# Patient Record
Sex: FEMALE | URBAN - METROPOLITAN AREA
[De-identification: names, ages, dates, MRNs, and addresses within clinical notes are randomized per-mention and may not be internally consistent; named-entity substitution may affect disease eponyms.]

---

## 2017-08-18 ENCOUNTER — IMPORTED ENCOUNTER (OUTPATIENT)
Dept: URBAN - METROPOLITAN AREA CLINIC 1 | Facility: CLINIC | Age: 27
End: 2017-08-18

## 2017-08-18 PROBLEM — H52.13: Noted: 2017-08-18

## 2017-08-18 PROCEDURE — S0621 ROUTINE OPHTHALMOLOGICAL EXA: HCPCS

## 2017-08-18 NOTE — PATIENT DISCUSSION
1. Myopia: Rx was given for correction if indicated and requested. Return for an appointment in 1 year 36 with Dr. Mateo Castillo.

## 2018-09-19 ENCOUNTER — IMPORTED ENCOUNTER (OUTPATIENT)
Dept: URBAN - METROPOLITAN AREA CLINIC 1 | Facility: CLINIC | Age: 28
End: 2018-09-19

## 2018-09-19 PROBLEM — H52.13: Noted: 2018-09-19

## 2018-09-19 PROCEDURE — S0621 ROUTINE OPHTHALMOLOGICAL EXA: HCPCS

## 2018-09-19 NOTE — PATIENT DISCUSSION
1. Myopia: Rx was given for correction if indicated and requested. Return for an appointment in 1 year 36 with Dr. Karley Marquez.

## 2019-11-22 ENCOUNTER — IMPORTED ENCOUNTER (OUTPATIENT)
Dept: URBAN - METROPOLITAN AREA CLINIC 1 | Facility: CLINIC | Age: 29
End: 2019-11-22

## 2019-11-22 PROBLEM — H52.13: Noted: 2019-11-22

## 2019-11-22 PROCEDURE — S0621 ROUTINE OPHTHALMOLOGICAL EXA: HCPCS

## 2019-11-22 NOTE — PATIENT DISCUSSION
1. Myopia: Rx was given for correction if indicated and requested. Return for an appointment in 1 year 36 with Dr. Moni Gavin.

## 2020-12-02 NOTE — PROGRESS NOTES
[facility]  [address], [address]  Phone: [phone]  Fax: [phone]        INITIAL CONSULTATION      HISTORY OF PRESENT ILLNESS:  Mason Holland is a 31 y.o. female whom is self-referred secondary to low back pain radiating into the RLE in a S1 distribution to the foot involving the digits x 5 years, constant x 12/2019. She rates her pain 10/10. Her pain is exacerbated by standing and sitting. She has treated with Flexeril and Ibuprofen. Pt reports intolerance to NEURONTIN. Pt previously took Topamax for migraine HA's. She discontinued the medication due to resolution of migraine HA's. She recalls tolerating the medication. Pt completed PT 2-3 years ago. She received chiropractic treatment 2 years ago. She is inconsistent with her HEP. Patient denies previous spinal surgery or injections. Pt denies change in bowel or bladder habits. Pt denies fever, weight loss, or skin changes. She denies possibility of pregnancy or breastfeeding. Patient denies history of glaucoma. She was previously seen by Dr. Jeffrey Robin. Pt is a nonsmoker. PmHx of obesity, peptic ulcer disease, anxiety, migraine LAROSE's. Note from [place], [place] dated 1/8/2020 indicating patient was seen with c/o right-sided low back pain with right sided sciatica x 6 years following a fall. Treated with Flexeril and Ibuprofen. Pain was 10/10. L spine XR dated 1/8/2020 films not independently reviewed. Per report, no significant abnormality. L spine MRI dated 2/3/2020 films independently reviewed. Per report, small non-impinging posterior disc protrusion L5-S1. Mild lower lumbar degenerative facet changes, no other disc abnormality stenosis or impingement. The patient is RHD.  reviewed. Body mass index is 38.9 kg/m².     PCP: CAROLINE Gilliam    Past Medical History:   Diagnosis Date    Anxiety     GERD (gastroesophageal reflux disease)     sees a gastroenterologist    Hypertension     Migraine     sees neurologist    Pap smear     sees OB/GYN    Peptic ulcer, unspecified site, unspecified as acute or chronic, without mention of hemorrhage or perforation     S/P endoscopy     peptic ulcer    Sigmoidoscopy exam 2010    internal hemorrhoids          Past Surgical History:   Procedure Laterality Date    HX  SECTION  2019    HX TONSILLECTOMY  2yo         Social History     Tobacco Use    Smoking status: Never Smoker    Smokeless tobacco: Never Used   Substance Use Topics    Alcohol use: No       Work status: The patient is employed. Marital status: Single. Current Outpatient Medications   Medication Sig Dispense Refill    lansoprazole (PREVACID) 30 mg capsule TAKE 1 CAPSULE BY MOUTH TWICE A DAY**SPECIFIC NDC PER INSURANCE      topiramate (TOPAMAX) 100 mg tablet Take  by mouth two (2) times a day.  lorazepam (ATIVAN) 0.5 mg tablet Take  by mouth.  norethindrone-e.estradiol-iron (LO LOESTRIN FE) 1-10 (24)-75(4) mg-mcg-mg Tab Take  by mouth.  paroxetine (PAXIL) 20 mg tablet Take 1 Tab by mouth daily. (Patient not taking: Reported on 2020) 30 Tab 11    ranitidine (ZANTAC) 300 mg tablet Take 1 Tab by mouth daily. (Patient not taking: Reported on 2020) 30 Tab 11       Allergies   Allergen Reactions    Latex Rash    Pcn [Penicillins] Hives, Rash and Swelling            Family History   Problem Relation Age of Onset    Asthma Father         outgrew    High Cholesterol Mother     Hypertension Mother          REVIEW OF SYSTEMS  Constitutional symptoms: Negative  Eyes: Negative  Ears, Nose, Throat, and Mouth: Negative  Cardiovascular: Negative  Respiratory: Negative  Genitourinary: Negative  Integumentary (Skin and/or breast): Negative  Musculoskeletal: Positive for low back pain radiating into the RLE. Extremities: Negative for edema.   Endocrine/Rheumatologic: Negative  Hematologic/Lymphatic: Negative  Allergic/Immunologic: Negative  Psychiatric: Negative       PHYSICAL EXAMINATION  Visit Vitals  /83 (BP 1 Location: Left arm, BP Patient Position: Sitting)   Pulse 87   Temp 96.9 °F (36.1 °C) (Temporal)   Ht 5' (1.524 m)   Wt 199 lb 3.2 oz (90.4 kg)   SpO2 99%   BMI 38.90 kg/m²       CONSTITUTIONAL: NAD, A&O x 3  HEART: Regular rate and rhythm  GASTROINTESTINAL: Positive bowel sounds, soft, nontender, and nondistended  LUNGS: Clear to auscultation bilaterally. SKIN: Negative for rash. RANGE OF MOTION: The patient has full passive range of motion in all four extremities. SENSATION: Sensation is intact to light touch throughout. MOTOR:   Straight Leg Raise: Negative, bilateral  Villarreal: Negative, bilateral  Deep tendon reflexes are 0 at the biceps, trace at the triceps, and 0 at the brachioradialis bilaterally. Deep tendon reflexes are 2 at the knees and 1 at the ankles bilaterally. BECKY SIGN: Mildly positive, right     Increased tenderness to direct palpation of right SI joint. Shoulder AB/Flex Elbow Flex Wrist Ext Elbow Ext Wrist Flex Hand Intrin Tone   Right +4/5 +4/5 +4/5 +4/5 +4/5 +4/5 +4/5   Left +4/5 +4/5 +4/5 +4/5 +4/5 +4/5 +4/5              Hip Flex Knee Ext Knee Flex Ankle DF GTE Ankle PF Tone   Right +4/5 +4/5 +4/5 +4/5 +4/5 +4/5 +4/5   Left +4/5 +4/5 +4/5 +4/5 +4/5 +4/5 +4/5       ASSESSMENT   Diagnoses and all orders for this visit:    1. HNP (herniated nucleus pulposus), lumbar  -     EMG ONE EXTREMITY LOWER RT; Future  -     pregabalin (LYRICA) 50 mg capsule; Take 1 Cap by mouth two (2) times a day. Max Daily Amount: 100 mg.    2. Severe obesity (HCC)  -     EMG ONE EXTREMITY LOWER RT; Future  -     pregabalin (LYRICA) 50 mg capsule; Take 1 Cap by mouth two (2) times a day. Max Daily Amount: 100 mg.    3. Lumbar neuritis  -     EMG ONE EXTREMITY LOWER RT; Future  -     pregabalin (LYRICA) 50 mg capsule; Take 1 Cap by mouth two (2) times a day.  Max Daily Amount: 100 mg.    4. DDD (degenerative disc disease), lumbar  -     EMG ONE EXTREMITY LOWER RT; Future  -     pregabalin (LYRICA) 50 mg capsule; Take 1 Cap by mouth two (2) times a day. Max Daily Amount: 100 mg. IMPRESSIONS/RECOMMENDATIONS:  Patient presents today with c/o low back pain radiating into the RLE in a S1 distribution to the foot involving the digits. Multiple treatment options were discussed. I will have the patient sign a release of medical information to obtain records from Dr. Laney Bennett. I recommended she increase the frequency of HEP to daily. I will try her on Lyrica 50 mg BID. The risks, benefits, and potential side effects of this medication were discussed. Patient understands and wishes to proceed. Patient advised to call the office if intolerant to new medication. I will refer her to Neurology for a RLE EMG. Patient is neurologically intact. I will see the patient back following the EMG or earlier if needed. Written by Randall Ayala, as dictated by Tabatha Mayo MD  I examined the patient, reviewed and agree with the note.

## 2020-12-07 ENCOUNTER — OFFICE VISIT (OUTPATIENT)
Dept: ORTHOPEDIC SURGERY | Age: 30
End: 2020-12-07
Payer: COMMERCIAL

## 2020-12-07 ENCOUNTER — TELEPHONE (OUTPATIENT)
Dept: ORTHOPEDIC SURGERY | Age: 30
End: 2020-12-07

## 2020-12-07 VITALS
OXYGEN SATURATION: 99 % | SYSTOLIC BLOOD PRESSURE: 122 MMHG | TEMPERATURE: 96.9 F | HEIGHT: 60 IN | WEIGHT: 199.2 LBS | DIASTOLIC BLOOD PRESSURE: 83 MMHG | HEART RATE: 87 BPM | BODY MASS INDEX: 39.11 KG/M2

## 2020-12-07 DIAGNOSIS — M51.26 HNP (HERNIATED NUCLEUS PULPOSUS), LUMBAR: Primary | ICD-10-CM

## 2020-12-07 DIAGNOSIS — M54.16 LUMBAR NEURITIS: ICD-10-CM

## 2020-12-07 DIAGNOSIS — E66.01 SEVERE OBESITY (HCC): ICD-10-CM

## 2020-12-07 DIAGNOSIS — M51.36 DDD (DEGENERATIVE DISC DISEASE), LUMBAR: ICD-10-CM

## 2020-12-07 PROCEDURE — 99204 OFFICE O/P NEW MOD 45 MIN: CPT | Performed by: PHYSICAL MEDICINE & REHABILITATION

## 2020-12-07 RX ORDER — PREGABALIN 50 MG/1
50 CAPSULE ORAL 2 TIMES DAILY
Qty: 60 CAP | Refills: 1 | Status: SHIPPED | OUTPATIENT
Start: 2020-12-07 | End: 2021-04-27

## 2020-12-07 RX ORDER — LANSOPRAZOLE 30 MG/1
CAPSULE, DELAYED RELEASE ORAL
COMMUNITY
Start: 2020-10-11

## 2020-12-07 NOTE — LETTER
12/7/20 Patient: Mely Torres YOB: 1990 Date of Visit: 12/7/2020 Lizet Lechuga PA-C 
[address] [place] [address] [address] [place] [address] VIA Facsimile: [phone] Dear Lizet Lechuga PA-C, Thank you for referring Ms. Nichelle Brown to [address] for evaluation. My notes for this consultation are attached. If you have questions, please do not hesitate to call me. I look forward to following your patient along with you. Sincerely, Honey Siu MD

## 2020-12-07 NOTE — TELEPHONE ENCOUNTER
EMG RLE is scheduled with Dr. Sammy Lane, [address] [place] [address], [address], [phone] on 12/29/20, arrive 9:30AM, test 10:00AM

## 2021-01-07 DIAGNOSIS — M51.26 HNP (HERNIATED NUCLEUS PULPOSUS), LUMBAR: ICD-10-CM

## 2021-01-07 DIAGNOSIS — E66.01 SEVERE OBESITY (HCC): ICD-10-CM

## 2021-01-07 DIAGNOSIS — M54.16 LUMBAR NEURITIS: ICD-10-CM

## 2021-01-07 DIAGNOSIS — M51.36 DDD (DEGENERATIVE DISC DISEASE), LUMBAR: ICD-10-CM

## 2021-01-07 NOTE — PROGRESS NOTES
[facility]  [address], [address]   Phone: [phone]  Fax: [phone]        PROGRESS NOTE      HISTORY OF PRESENT ILLNESS:  The patient is a 31 y.o. female and was seen today for follow up of low back pain radiating into the RLE in an unclear distribution to the foot involving the digits x 5 years, constant x 12/2019. Her pain is exacerbated by standing and sitting. She has treated with Flexeril and Ibuprofen. Pt reports intolerance to NEURONTIN. Pt previously took Topamax for migraine HA's. She discontinued the medication due to resolution of migraine HA's. She recalls tolerating the medication. Pt completed PT 2-3 years ago. She received chiropractic treatment 2 years ago. She is inconsistent with her HEP. Patient denies previous spinal surgery or injections. Pt denies change in bowel or bladder habits. Pt denies fever, weight loss, or skin changes. She denies possibility of pregnancy or breastfeeding. Patient denies history of glaucoma. She was previously seen by Dr. Petar Iniguez. Pt is a nonsmoker. The patient is RHD. PmHx of obesity, peptic ulcer disease, anxiety, migraine LAROSE's. Note from [place], [place] dated 1/8/2020 indicating patient was seen with c/o right-sided low back pain with right sided sciatica x 6 years following a fall. Treated with Flexeril and Ibuprofen. Pain was 10/10. L spine XR dated 1/8/2020 films not independently reviewed. Per report, no significant abnormality. L spine MRI dated 2/3/2020 films independently reviewed. Per report, small non-impinging posterior disc protrusion L5-S1. Mild lower lumbar degenerative facet changes, no other disc abnormality stenosis or impingement. At her last clinical appointment, I had the patient sign a release of medical information to obtain records from Dr. Petar Iniguez. I recommended she increase the frequency of HEP to daily. I tried her on Lyrica 50 mg BID.  I referred her to Neurology for a RLE EMG.        The patient returns today and reports pain location and distribution remains unchanged. She rates her pain 1-10/10, previously 10/10. Her pain is worse at night. She is tolerating the Lyrica 50 mg BID without benefit. Additionally, she treats with ibuprofen 800 mg 1-2 x/day. She is compliant with her HEP. Pt denies change in bowel or bladder habits. Note from [place] dated 1/8/2020 indicating patient was seen with c/o right-sided low back pain. Treated with Flexeril. Recommended she take Ibuprofen. Referred to Orthopedics. Note from [place], [address] dated 2/7/2020 indicating patient's pain was 0/10. Some relief with Flexeril. Noted small no impingement disc protrusion L5-S1. Note from Dr. Robb Julio dated 2/17/2020 indicating patient was seen with c/o righ-sided buttocks pain. Pain was 5/10. Worse with standing. Taking Neurontin. Pain in roughly L4 or L5 distribution. Indicated he wanted to do a right piriformis injection. Pt reports she did not follow through with injection. Note from Aniya Linder DPM dated 8/14/2020 indicating patient was seen with c/o pain on the medial aspect of the right foot. Worse with walking. Improved with rest. Minimal relief with Flexeril and Ibuprofen. Treated with Steroids and recommended a unna boot. RLE EMG dated 12/29/2020 by Dr. Jannette Saldana indicated though her symptoms are fairly typical and likely coming from radiculopathy, there is no electrodiagnostic evidence to suggest radiculopathy on this examination.  reviewed. Body mass index is 38.86 kg/m².     PCP: CAROLINE Lucia      Past Medical History:   Diagnosis Date    Anxiety     GERD (gastroesophageal reflux disease)     sees a gastroenterologist    Hypertension     Migraine     sees neurologist    Pap smear     sees OB/GYN    Peptic ulcer, unspecified site, unspecified as acute or chronic, without mention of hemorrhage or perforation     S/P endoscopy 2006    peptic ulcer    Sigmoidoscopy exam 2010    internal hemorrhoids        Social History     Socioeconomic History    Marital status: SINGLE     Spouse name: Not on file    Number of children: Not on file    Years of education: Not on file    Highest education level: Not on file   Occupational History    Occupation: dental assistant   Social Needs    Financial resource strain: Not on file    Food insecurity     Worry: Not on file     Inability: Not on file    Transportation needs     Medical: Not on file     Non-medical: Not on file   Tobacco Use    Smoking status: Never Smoker    Smokeless tobacco: Never Used   Substance and Sexual Activity    Alcohol use: No    Drug use: No    Sexual activity: Yes     Partners: Male   Lifestyle    Physical activity     Days per week: Not on file     Minutes per session: Not on file    Stress: Not on file   Relationships    Social connections     Talks on phone: Not on file     Gets together: Not on file     Attends Mandaeism service: Not on file     Active member of club or organization: Not on file     Attends meetings of clubs or organizations: Not on file     Relationship status: Not on file    Intimate partner violence     Fear of current or ex partner: Not on file     Emotionally abused: Not on file     Physically abused: Not on file     Forced sexual activity: Not on file   Other Topics Concern    Not on file   Social History Narrative    Not on file       Current Outpatient Medications   Medication Sig Dispense Refill    ibuprofen (MOTRIN) 800 mg tablet Take 1 Tab by mouth two (2) times daily as needed for Pain. 60 Tab 0    lansoprazole (PREVACID) 30 mg capsule TAKE 1 CAPSULE BY MOUTH TWICE A DAY**SPECIFIC NDC PER INSURANCE      pregabalin (LYRICA) 50 mg capsule Take 1 Cap by mouth two (2) times a day. Max Daily Amount: 100 mg. 60 Cap 1    topiramate (TOPAMAX) 100 mg tablet Take  by mouth two (2) times a day.  lorazepam (ATIVAN) 0.5 mg tablet Take  by mouth.       norethindrone-e.estradiol-iron (LO LOESTRIN FE) 1-10 (24)-75(4) mg-mcg-mg Tab Take  by mouth.  paroxetine (PAXIL) 20 mg tablet Take 1 Tab by mouth daily. (Patient not taking: Reported on 12/7/2020) 30 Tab 11    ranitidine (ZANTAC) 300 mg tablet Take 1 Tab by mouth daily. (Patient not taking: Reported on 12/7/2020) 30 Tab 11       Allergies   Allergen Reactions    Latex Rash    Pcn [Penicillins] Hives, Rash and Swelling          PHYSICAL EXAMINATION    Visit Vitals  /81 (BP 1 Location: Left arm)   Pulse 86   Temp 98.3 °F (36.8 °C)   Resp 18   Ht 5' (1.524 m)   Wt 199 lb (90.3 kg)   SpO2 100%   BMI 38.86 kg/m²       CONSTITUTIONAL: NAD, A&O x 3  SENSATION: Intact to light touch throughout  RANGE OF MOTION: The patient has full passive range of motion in all four extremities. MOTOR:  Straight Leg Raise: Negative, bilateral   BECKY SIGN: Positive, right     Increased tenderness with direct palpation of the right SI joint. Hip Flex Knee Ext Knee Flex Ankle DF GTE Ankle PF Tone   Right +4/5 +4/5 +4/5 +4/5 +4/5 +4/5 +4/5   Left +4/5 +4/5 +4/5 +4/5 +4/5 +4/5 +4/5       ASSESSMENT   Diagnoses and all orders for this visit:    1. HNP (herniated nucleus pulposus), lumbar  -     ibuprofen (MOTRIN) 800 mg tablet; Take 1 Tab by mouth two (2) times daily as needed for Pain. -     pregabalin (Lyrica) 75 mg capsule; Take 1 Cap by mouth two (2) times a day. Max Daily Amount: 150 mg.    2. Lumbar neuritis  -     ibuprofen (MOTRIN) 800 mg tablet; Take 1 Tab by mouth two (2) times daily as needed for Pain. -     pregabalin (Lyrica) 75 mg capsule; Take 1 Cap by mouth two (2) times a day. Max Daily Amount: 150 mg.    3. DDD (degenerative disc disease), lumbar  -     ibuprofen (MOTRIN) 800 mg tablet; Take 1 Tab by mouth two (2) times daily as needed for Pain. -     pregabalin (Lyrica) 75 mg capsule; Take 1 Cap by mouth two (2) times a day.  Max Daily Amount: 150 mg.    4. Severe obesity (HCC)  -     ibuprofen (MOTRIN) 800 mg tablet; Take 1 Tab by mouth two (2) times daily as needed for Pain. -     pregabalin (Lyrica) 75 mg capsule; Take 1 Cap by mouth two (2) times a day. Max Daily Amount: 150 mg.    5. Sacroiliitis (HCC)  -     ibuprofen (MOTRIN) 800 mg tablet; Take 1 Tab by mouth two (2) times daily as needed for Pain. -     pregabalin (Lyrica) 75 mg capsule; Take 1 Cap by mouth two (2) times a day. Max Daily Amount: 150 mg.      IMPRESSION AND PLAN:  Patient returns to the office today with c/o low back pain radiating into the RLE in an unclear distribution to the foot involving the digits. Multiple treatment options were discussed. I offered a SI joint injection for diagnostic purposes, pt deferred. I offered to refer her to PT for SI joint, pt declined. I will increase her Lyrica from 50 mg BID to 75 mg BID. Patient advised to call the office if intolerant to higher dose. I provided her Ibuprofen 800 mg to use prn. I encouraged her to continue to perform her daily HEP. Patient is neurologically intact. I will see the patient back in 1 month's time or earlier if needed. Written by Navya Downing, as dictated by Paulina Vargas MD  I examined the patient, reviewed and agree with the note.

## 2021-01-08 ENCOUNTER — OFFICE VISIT (OUTPATIENT)
Dept: ORTHOPEDIC SURGERY | Age: 31
End: 2021-01-08
Payer: COMMERCIAL

## 2021-01-08 VITALS
WEIGHT: 199 LBS | OXYGEN SATURATION: 100 % | SYSTOLIC BLOOD PRESSURE: 126 MMHG | RESPIRATION RATE: 18 BRPM | DIASTOLIC BLOOD PRESSURE: 81 MMHG | BODY MASS INDEX: 39.07 KG/M2 | HEART RATE: 86 BPM | TEMPERATURE: 98.3 F | HEIGHT: 60 IN

## 2021-01-08 DIAGNOSIS — M51.36 DDD (DEGENERATIVE DISC DISEASE), LUMBAR: ICD-10-CM

## 2021-01-08 DIAGNOSIS — M51.26 HNP (HERNIATED NUCLEUS PULPOSUS), LUMBAR: Primary | ICD-10-CM

## 2021-01-08 DIAGNOSIS — M54.16 LUMBAR NEURITIS: ICD-10-CM

## 2021-01-08 DIAGNOSIS — M46.1 SACROILIITIS (HCC): ICD-10-CM

## 2021-01-08 DIAGNOSIS — E66.01 SEVERE OBESITY (HCC): ICD-10-CM

## 2021-01-08 PROCEDURE — 99214 OFFICE O/P EST MOD 30 MIN: CPT | Performed by: PHYSICAL MEDICINE & REHABILITATION

## 2021-01-08 RX ORDER — PREGABALIN 75 MG/1
75 CAPSULE ORAL 2 TIMES DAILY
Qty: 60 CAP | Refills: 1 | Status: SHIPPED | OUTPATIENT
Start: 2021-01-08 | End: 2021-04-27

## 2021-01-08 RX ORDER — IBUPROFEN 800 MG/1
800 TABLET ORAL
Qty: 60 TAB | Refills: 0 | Status: SHIPPED | OUTPATIENT
Start: 2021-01-08 | End: 2021-02-04

## 2021-01-08 NOTE — LETTER
1/8/2021 Patient: Norman Aquino YOB: 1990 Date of Visit: 1/8/2021 Jenny Kee, [address] [address] [address] [address] Via Fax: [phone] Dear CAROLINE Lockett, Thank you for referring Ms. Aiden Moralez to [address] for evaluation. My notes for this consultation are attached. If you have questions, please do not hesitate to call me. I look forward to following your patient along with you. Sincerely, Keiry Diego MD

## 2021-02-01 ENCOUNTER — TELEPHONE (OUTPATIENT)
Dept: ORTHOPEDIC SURGERY | Age: 31
End: 2021-02-01

## 2021-02-01 NOTE — TELEPHONE ENCOUNTER
Patient is requesting a higher dose of Lyrica and push out her appt for another month for follow up on this medication.      Patient [phone]  [facility]

## 2021-02-03 NOTE — PROGRESS NOTES
[facility]  [address], [place]   Phone: [phone]  Fax: [phone]        PROGRESS NOTE      HISTORY OF PRESENT ILLNESS:  The patient is a 31 y.o. female and was seen today for follow up of low back pain radiating into the RLE in an unclear distribution to the foot involving the digits x 5 years, constant x 12/2019. Her pain is exacerbated by standing and sitting. She has treated with Flexeril and Ibuprofen. Pt reports intolerance to NEURONTIN. Pt previously took Topamax for migraine HA's. She discontinued the medication due to resolution of migraine HA's. She recalls tolerating the medication. Pt completed PT 2-3 years ago. She received chiropractic treatment 2 years ago. She is inconsistent with her HEP. Patient denies previous spinal surgery or injections. Pt denies change in bowel or bladder habits. Pt denies fever, weight loss, or skin changes. She denies possibility of pregnancy or breastfeeding. Patient denies history of glaucoma. She was previously seen by Dr. Myron Thomas. Pt is a nonsmoker. The patient is RHD. PmHx of obesity, peptic ulcer disease, anxiety, migraine LAROSE's. Note from CAROLINE Shelton dated 1/8/2020 indicating patient was seen with c/o right-sided low back pain with right sided sciatica x 6 years following a fall. Treated with Flexeril and Ibuprofen. Pain was 10/10. Note from Lazarus Minion dated 1/8/2020 indicating patient was seen with c/o right-sided low back pain. Treated with Flexeril. Recommended she take Ibuprofen. Referred to Orthopedics. Note from Grayce Prader, [place] dated 2/7/2020 indicating patient's pain was 0/10. Some relief with Flexeril. Noted small no impingement disc protrusion L5-S1. Note from Dr. Myron Thomas dated 2/17/2020 indicating patient was seen with c/o righ-sided buttocks pain. Pain was 5/10. Worse with standing. Taking Neurontin. Pain in roughly L4 or L5 distribution.  Indicated he wanted to do a right piriformis injection. Pt reports she did not follow through with injection. Note from César Wasserman DPM dated 8/14/2020 indicating patient was seen with c/o pain on the medial aspect of the right foot. Worse with walking. Improved with rest. Minimal relief with Flexeril and Ibuprofen. Treated with Steroids and recommended a unna boot. L spine XR dated 1/8/2020 films not independently reviewed. Per report, no significant abnormality. L spine MRI dated 2/3/2020 films independently reviewed. Per report, small non-impinging posterior disc protrusion L5-S1. Mild lower lumbar degenerative facet changes, no other disc abnormality stenosis or impingement. RLE EMG dated 12/29/2020 by Dr. Anisa Edgar indicated though her symptoms are fairly typical and likely coming from radiculopathy, there is no electrodiagnostic evidence to suggest radiculopathy on this examination. At her last clinical appointment, I offered a SI joint injection for diagnostic purposes, pt deferred. I offered to refer her to PT for SI joint, pt declined. I increased her Lyrica from 50 mg BID to 75 mg BID. I provided her Ibuprofen 800 mg to use prn. I encouraged her to continue to perform her daily HEP. The patient returns today and reports pain location and distribution remains unchanged. She rates her pain 2-9/10, previously 1-10/10. She is tolerating the increased dose of Lyrica 75 mg BID with some benefit. She reports 1 day of \"feeling drunk\" after increasing the dose, but notes improvement. She is compliant with her HEP. Pt denies change in bowel or bladder habits.  reviewed. Body mass index is 38.73 kg/m².     PCP: CAROLINE Luz      Past Medical History:   Diagnosis Date    Anxiety     GERD (gastroesophageal reflux disease)     sees a gastroenterologist    Hypertension     Migraine     sees neurologist    Pap smear     sees OB/GYN    Peptic ulcer, unspecified site, unspecified as acute or chronic, without mention of hemorrhage or perforation     S/P endoscopy 2006    peptic ulcer    Sigmoidoscopy exam 2010    internal hemorrhoids        Social History     Socioeconomic History    Marital status: SINGLE     Spouse name: Not on file    Number of children: Not on file    Years of education: Not on file    Highest education level: Not on file   Occupational History    Occupation: dental assistant   Social Needs    Financial resource strain: Not on file    Food insecurity     Worry: Not on file     Inability: Not on file    Transportation needs     Medical: Not on file     Non-medical: Not on file   Tobacco Use    Smoking status: Never Smoker    Smokeless tobacco: Never Used   Substance and Sexual Activity    Alcohol use: No    Drug use: No    Sexual activity: Yes     Partners: Male   Lifestyle    Physical activity     Days per week: Not on file     Minutes per session: Not on file    Stress: Not on file   Relationships    Social connections     Talks on phone: Not on file     Gets together: Not on file     Attends Pentecostalism service: Not on file     Active member of club or organization: Not on file     Attends meetings of clubs or organizations: Not on file     Relationship status: Not on file    Intimate partner violence     Fear of current or ex partner: Not on file     Emotionally abused: Not on file     Physically abused: Not on file     Forced sexual activity: Not on file   Other Topics Concern    Not on file   Social History Narrative    Not on file       Current Outpatient Medications   Medication Sig Dispense Refill    ibuprofen (MOTRIN) 800 mg tablet TAKE 1 TAB BY MOUTH TWO (2) TIMES DAILY AS NEEDED FOR PAIN. 60 Tab 1    pregabalin (Lyrica) 75 mg capsule Take 1 Cap by mouth two (2) times a day. Max Daily Amount: 150 mg. 60 Cap 1    lansoprazole (PREVACID) 30 mg capsule TAKE 1 CAPSULE BY MOUTH TWICE A DAY**SPECIFIC NDC PER INSURANCE      pregabalin (LYRICA) 50 mg capsule Take 1 Cap by mouth two (2) times a day. Max Daily Amount: 100 mg. 60 Cap 1    topiramate (TOPAMAX) 100 mg tablet Take  by mouth two (2) times a day.  lorazepam (ATIVAN) 0.5 mg tablet Take  by mouth.  norethindrone-e.estradiol-iron (LO LOESTRIN FE) 1-10 (24)-75(4) mg-mcg-mg Tab Take  by mouth.  paroxetine (PAXIL) 20 mg tablet Take 1 Tab by mouth daily. (Patient not taking: Reported on 12/7/2020) 30 Tab 11    ranitidine (ZANTAC) 300 mg tablet Take 1 Tab by mouth daily. (Patient not taking: Reported on 12/7/2020) 30 Tab 11       Allergies   Allergen Reactions    Latex Rash    Pcn [Penicillins] Hives, Rash and Swelling          PHYSICAL EXAMINATION    Visit Vitals  /79 (BP 1 Location: Left upper arm)   Pulse 79   Temp 97.6 °F (36.4 °C)   Resp 17   Ht 5' 1\" (1.549 m)   Wt 205 lb (93 kg)   SpO2 97%   BMI 38.73 kg/m²       CONSTITUTIONAL: NAD, A&O x 3  SENSATION: Intact to light touch throughout  RANGE OF MOTION: The patient has full passive range of motion in all four extremities. MOTOR:  Straight Leg Raise: Negative, bilateral  BECKY SIGN: Positive, right     Increased tenderness with direct palpation of right SI joint. Hip Flex Knee Ext Knee Flex Ankle DF GTE Ankle PF Tone   Right +4/5 +4/5 +4/5 +4/5 +4/5 +4/5 +4/5   Left +4/5 +4/5 +4/5 +4/5 +4/5 +4/5 +4/5       ASSESSMENT   Diagnoses and all orders for this visit:    1. HNP (herniated nucleus pulposus), lumbar    2. Lumbar neuritis    3. DDD (degenerative disc disease), lumbar    4. Severe obesity ([address]    5. Sacroiliitis ([address]    Other orders  -     pregabalin (Lyrica) 150 mg capsule; Take 1 Cap by mouth two (2) times a day. Max Daily Amount: 300 mg. IMPRESSION AND PLAN:  Patient returns to the office today with c/o low back pain radiating into the RLE in an unclear distribution to the foot involving the digits. Multiple treatment options were discussed. I offered a SI joint injection, pt declined. I will increase her Lyrica from 75 mg BID to 150 mg BID. Patient advised to call the office if intolerant to higher dose. I encouraged her to continue to perform her daily HEP. Patient is neurologically intact. I will see the patient back in 1 month's time or earlier if needed. Written by Mihaela Pagan, as dictated by Claire Holland MD  I examined the patient, reviewed and agree with the note.

## 2021-02-04 DIAGNOSIS — E66.01 SEVERE OBESITY (HCC): ICD-10-CM

## 2021-02-04 DIAGNOSIS — M51.26 HNP (HERNIATED NUCLEUS PULPOSUS), LUMBAR: ICD-10-CM

## 2021-02-04 DIAGNOSIS — M51.36 DDD (DEGENERATIVE DISC DISEASE), LUMBAR: ICD-10-CM

## 2021-02-04 DIAGNOSIS — M46.1 SACROILIITIS (HCC): ICD-10-CM

## 2021-02-04 DIAGNOSIS — M54.16 LUMBAR NEURITIS: ICD-10-CM

## 2021-02-04 RX ORDER — IBUPROFEN 800 MG/1
800 TABLET ORAL
Qty: 60 TAB | Refills: 1 | Status: SHIPPED | OUTPATIENT
Start: 2021-02-04

## 2021-02-04 NOTE — TELEPHONE ENCOUNTER
I spoke with the patient and she states that she has not been taking this daily and that her pharmacy has place this medication on an automatic refill and she will contact them to take it off.

## 2021-02-04 NOTE — TELEPHONE ENCOUNTER
Signed, but please remind patient this is A PRN medication. We really do not want he taking it BID every day.

## 2021-02-05 ENCOUNTER — OFFICE VISIT (OUTPATIENT)
Dept: ORTHOPEDIC SURGERY | Age: 31
End: 2021-02-05
Payer: COMMERCIAL

## 2021-02-05 VITALS
BODY MASS INDEX: 38.71 KG/M2 | OXYGEN SATURATION: 97 % | RESPIRATION RATE: 17 BRPM | HEIGHT: 61 IN | DIASTOLIC BLOOD PRESSURE: 79 MMHG | SYSTOLIC BLOOD PRESSURE: 120 MMHG | WEIGHT: 205 LBS | HEART RATE: 79 BPM | TEMPERATURE: 97.6 F

## 2021-02-05 DIAGNOSIS — M51.36 DDD (DEGENERATIVE DISC DISEASE), LUMBAR: ICD-10-CM

## 2021-02-05 DIAGNOSIS — M51.26 HNP (HERNIATED NUCLEUS PULPOSUS), LUMBAR: Primary | ICD-10-CM

## 2021-02-05 DIAGNOSIS — E66.01 SEVERE OBESITY (HCC): ICD-10-CM

## 2021-02-05 DIAGNOSIS — M54.16 LUMBAR NEURITIS: ICD-10-CM

## 2021-02-05 DIAGNOSIS — M46.1 SACROILIITIS (HCC): ICD-10-CM

## 2021-02-05 PROCEDURE — 99214 OFFICE O/P EST MOD 30 MIN: CPT | Performed by: PHYSICAL MEDICINE & REHABILITATION

## 2021-02-05 RX ORDER — PREGABALIN 150 MG/1
150 CAPSULE ORAL 2 TIMES DAILY
Qty: 60 CAP | Refills: 1 | Status: SHIPPED | OUTPATIENT
Start: 2021-02-05 | End: 2021-11-23

## 2021-02-05 NOTE — LETTER
2/5/2021 Patient: Oz Adorno YOB: 1990 Date of Visit: 2/5/2021 Eloy García, [address] [address] [place] [address] Via Fax: [phone] Dear CAROLINE Brown, Thank you for referring Ms. Caesar Ji to [address] for evaluation. My notes for this consultation are attached. If you have questions, please do not hesitate to call me. I look forward to following your patient along with you. Sincerely, Shahida Bejarano MD

## 2021-02-12 ENCOUNTER — IMPORTED ENCOUNTER (OUTPATIENT)
Dept: URBAN - METROPOLITAN AREA CLINIC 1 | Facility: CLINIC | Age: 31
End: 2021-02-12

## 2021-02-12 PROBLEM — H52.13: Noted: 2021-02-12

## 2021-02-12 PROBLEM — H52.222: Noted: 2021-02-12

## 2021-02-12 PROCEDURE — S0621 ROUTINE OPHTHALMOLOGICAL EXA: HCPCS

## 2021-02-12 NOTE — PATIENT DISCUSSION
1. Myopia w/ Astigmatism OS -- Rx was given for correction if indicated and requested. Return for an appointment in 1 year 36 with Dr. Diane Schultz.

## 2021-03-17 NOTE — PROGRESS NOTES
[facility]  [address], [place]   Phone: [phone]  Fax: [phone]        PROGRESS NOTE      HISTORY OF PRESENT ILLNESS:  The patient is a 31 y.o. female and was seen today for follow up of low back pain radiating into the RLE in an unclear distribution to the foot involving the digits x 5 years, constant x 12/2019. Her pain is exacerbated by standing and sitting. She has treated with Flexeril and Ibuprofen. Pt reports intolerance to NEURONTIN. Pt previously took Topamax for migraine HA's. She discontinued the medication due to resolution of migraine HA's. She recalls tolerating the medication. Pt completed PT 2-3 years ago. She received chiropractic treatment 2 years ago. She is inconsistent with her HEP. Patient denies previous spinal surgery or injections. Pt denies change in bowel or bladder habits. Pt denies fever, weight loss, or skin changes. She denies possibility of pregnancy or breastfeeding. Patient denies history of glaucoma. She was previously seen by Dr. Winston Sabillon. Pt is a nonsmoker. The patient is RHD. PmHx of obesity, peptic ulcer disease, anxiety, migraine LAROSE's. Note from CAROLINE Shelton dated 1/8/2020 indicating patient was seen with c/o right-sided low back pain with right sided sciatica x 6 years following a fall. Treated with Flexeril and Ibuprofen. Pain was 10/10. Note from Jasmin Leal dated 1/8/2020 indicating patient was seen with c/o right-sided low back pain. Treated with Flexeril. Recommended she take Ibuprofen. Referred to Orthopedics. Note from CAROLINE Lombardi dated 2/7/2020 indicating patient's pain was 0/10. Some relief with Flexeril. Noted small no impingement disc protrusion L5-S1. Note from Dr. Mariee dated 2/17/2020 indicating patient was seen with c/o righ-sided buttocks pain. Pain was 5/10. Worse with standing. Taking Neurontin. Pain in roughly L4 or L5 distribution.  Indicated he wanted to do a right piriformis injection. Pt reports she did not follow through with injection. Note from Jabari Estes Milo [facility] 8/14/2020 indicating patient was seen with c/o pain on the medial aspect of the right foot. Worse with walking. Improved with rest. Minimal relief with Flexeril and Ibuprofen. Treated with Steroids and recommended a unna boot. L spine XR dated 1/8/2020 films not independently reviewed. Per report, no significant abnormality. L spine MRI dated 2/3/2020 films independently reviewed. Per report, small non-impinging posterior disc protrusion L5-S1. Mild lower lumbar degenerative facet changes, no other disc abnormality stenosis or impingement. RLE EMG dated 12/29/2020 by Dr. Andrea Angeles indicated though her symptoms are fairly typical and likely coming from radiculopathy, there is no electrodiagnostic evidence to suggest radiculopathy on this examination. At her last clinical appointment, I offered a SI joint injection, pt declined. I increased her Lyrica from 75 mg BID to 150 mg BID. I encouraged her to continue to perform her daily HEP. The patient returns today and reports pain location and distribution remains unchanged. She rates her pain 2-9/10, unchanged. She is tolerating the increased dose of Lyrica 150 mg BID with some benefit. She notes improvement in her pain on days with limited standing at work. She still continues to endorse high levels of pain on days with increased standing at work. She is compliant with her HEP. Pt denies change in bowel or bladder habits.  reviewed. Body mass index is 38.81 kg/m².     PCP: Kenton Cabot, [place]      Past Medical History:   Diagnosis Date    Anxiety     GERD (gastroesophageal reflux disease)     sees a gastroenterologist    Hypertension     Migraine     sees neurologist    Pap smear     sees OB/GYN    Peptic ulcer, unspecified site, unspecified as acute or chronic, without mention of hemorrhage or perforation     S/P endoscopy 2006    peptic ulcer  Sigmoidoscopy exam 2010    internal hemorrhoids        Social History     Socioeconomic History    Marital status: SINGLE     Spouse name: Not on file    Number of children: Not on file    Years of education: Not on file    Highest education level: Not on file   Occupational History    Occupation: dental assistant   Social Needs    Financial resource strain: Not on file    Food insecurity     Worry: Not on file     Inability: Not on file    Transportation needs     Medical: Not on file     Non-medical: Not on file   Tobacco Use    Smoking status: Never Smoker    Smokeless tobacco: Never Used   Substance and Sexual Activity    Alcohol use: No    Drug use: No    Sexual activity: Yes     Partners: Male   Lifestyle    Physical activity     Days per week: Not on file     Minutes per session: Not on file    Stress: Not on file   Relationships    Social connections     Talks on phone: Not on file     Gets together: Not on file     Attends [employer]ology service: Not on file     Active member of club or organization: Not on file     Attends meetings of clubs or organizations: Not on file     Relationship status: Not on file    Intimate partner violence     Fear of current or ex partner: Not on file     Emotionally abused: Not on file     Physically abused: Not on file     Forced sexual activity: Not on file   Other Topics Concern    Not on file   Social History Narrative    Not on file       Current Outpatient Medications   Medication Sig Dispense Refill    sertraline (ZOLOFT) 50 mg tablet       hydrOXYzine HCL (ATARAX) 25 mg tablet       pregabalin (Lyrica) 150 mg capsule Take 1 Cap by mouth two (2) times a day. Max Daily Amount: 300 mg. 60 Cap 1    ibuprofen (MOTRIN) 800 mg tablet TAKE 1 TAB BY MOUTH TWO (2) TIMES DAILY AS NEEDED FOR PAIN.  60 Tab 1    lansoprazole (PREVACID) 30 mg capsule TAKE 1 CAPSULE BY MOUTH TWICE A DAY**SPECIFIC NDC PER INSURANCE      pregabalin (Lyrica) 75 mg capsule Take 1 Cap by mouth two (2) times a day. Max Daily Amount: 150 mg. 60 Cap 1    pregabalin (LYRICA) 50 mg capsule Take 1 Cap by mouth two (2) times a day. Max Daily Amount: 100 mg. 60 Cap 1    topiramate (TOPAMAX) 100 mg tablet Take  by mouth two (2) times a day.  lorazepam (ATIVAN) 0.5 mg tablet Take  by mouth.  norethindrone-e.estradiol-iron (LO LOESTRIN FE) 1-10 (24)-75(4) mg-mcg-mg Tab Take  by mouth.  paroxetine (PAXIL) 20 mg tablet Take 1 Tab by mouth daily. (Patient not taking: Reported on 12/7/2020) 30 Tab 11    ranitidine (ZANTAC) 300 mg tablet Take 1 Tab by mouth daily. (Patient not taking: Reported on 12/7/2020) 30 Tab 11       Allergies   Allergen Reactions    Latex Rash    Pcn [Penicillins] Hives, Rash and Swelling          PHYSICAL EXAMINATION    Visit Vitals  /83 (BP 1 Location: Left arm, BP Patient Position: Sitting)   Pulse 91   Temp 97 °F (36.1 °C) (Skin)   Wt 205 lb 6.4 oz (93.2 kg)   SpO2 99%   BMI 38.81 kg/m²       CONSTITUTIONAL: NAD, A&O x 3  SENSATION: Intact to light touch throughout  RANGE OF MOTION: The patient has full passive range of motion in all four extremities. MOTOR:  Straight Leg Raise: Negative, bilateral  BECKY SIGN: Positive, right     Increased tenderness to direct palpation of the right SI joint. Hip Flex Knee Ext Knee Flex Ankle DF GTE Ankle PF Tone   Right +4/5 +4/5 +4/5 +4/5 +4/5 +4/5 +4/5   Left +4/5 +4/5 +4/5 +4/5 +4/5 +4/5 +4/5       ASSESSMENT   Diagnoses and all orders for this visit:    1. HNP (herniated nucleus pulposus), lumbar    2. Lumbar neuritis    3. DDD (degenerative disc disease), lumbar    4. Severe obesity ([address]    5. Sacroiliitis ([address]      IMPRESSION AND PLAN:  Patient returns to the office today with c/o low back pain radiating into the RLE in an unclear distribution to the foot involving the digits. Multiple treatment options were discussed. Clinically, her symptoms are consistent with SI joint dysfunction.  Pt elected to proceed with injection. I will order a right SI joint injection. She should continue on Lyrica 150 mg BID and I provided her refills. Patient is neurologically intact. I will see the patient back following the injection or earlier if needed. Written by Georgetta Brunner, as dictated by Barbara Andrea MD  I examined the patient, reviewed and agree with the note.

## 2021-03-19 ENCOUNTER — OFFICE VISIT (OUTPATIENT)
Dept: ORTHOPEDIC SURGERY | Age: 31
End: 2021-03-19
Payer: COMMERCIAL

## 2021-03-19 VITALS
SYSTOLIC BLOOD PRESSURE: 128 MMHG | HEART RATE: 91 BPM | TEMPERATURE: 97 F | BODY MASS INDEX: 38.81 KG/M2 | WEIGHT: 205.4 LBS | OXYGEN SATURATION: 99 % | DIASTOLIC BLOOD PRESSURE: 83 MMHG

## 2021-03-19 DIAGNOSIS — M54.16 LUMBAR NEURITIS: ICD-10-CM

## 2021-03-19 DIAGNOSIS — E66.01 SEVERE OBESITY (HCC): ICD-10-CM

## 2021-03-19 DIAGNOSIS — M46.1 SACROILIITIS (HCC): ICD-10-CM

## 2021-03-19 DIAGNOSIS — M51.26 HNP (HERNIATED NUCLEUS PULPOSUS), LUMBAR: Primary | ICD-10-CM

## 2021-03-19 DIAGNOSIS — M51.36 DDD (DEGENERATIVE DISC DISEASE), LUMBAR: ICD-10-CM

## 2021-03-19 PROCEDURE — 99213 OFFICE O/P EST LOW 20 MIN: CPT | Performed by: PHYSICAL MEDICINE & REHABILITATION

## 2021-03-19 RX ORDER — HYDROXYZINE 25 MG/1
TABLET, FILM COATED ORAL
COMMUNITY
Start: 2021-03-12 | End: 2021-06-01 | Stop reason: ALTCHOICE

## 2021-03-19 RX ORDER — SERTRALINE HYDROCHLORIDE 50 MG/1
100 TABLET, FILM COATED ORAL
COMMUNITY
Start: 2021-03-12

## 2021-03-19 NOTE — LETTER
3/19/2021 Patient: Jil Campo YOB: 1990 Date of Visit: 3/19/2021 Zafar Mclain, [address] [address] [place] [id number] Via Fax: [phone] Dear CAROLINE Quintero, Thank you for referring Ms. Ladarius Prieto to [address] for evaluation. My notes for this consultation are attached. If you have questions, please do not hesitate to call me. I look forward to following your patient along with you. Sincerely, Dakota Salter MD

## 2021-04-26 DIAGNOSIS — M51.36 DDD (DEGENERATIVE DISC DISEASE), LUMBAR: ICD-10-CM

## 2021-04-26 DIAGNOSIS — E66.01 SEVERE OBESITY (HCC): ICD-10-CM

## 2021-04-26 DIAGNOSIS — M54.16 LUMBAR NEURITIS: ICD-10-CM

## 2021-04-26 DIAGNOSIS — M46.1 SACROILIITIS (HCC): ICD-10-CM

## 2021-04-26 DIAGNOSIS — M51.26 HNP (HERNIATED NUCLEUS PULPOSUS), LUMBAR: ICD-10-CM

## 2021-04-26 RX ORDER — PREGABALIN 150 MG/1
150 CAPSULE ORAL 2 TIMES DAILY
Qty: 60 CAP | Refills: 1 | OUTPATIENT
Start: 2021-04-26

## 2021-04-26 NOTE — TELEPHONE ENCOUNTER
Patient called to verify the need to return for a visit prior to getting her Lyrica refilled. She was last seen in March 2021, last month, and she recalls provider mentioning to continue on Lyrica and that another fill would be sent in for her. She is just wanting to make sure she has to come in. Please advise.      Patient [phone]

## 2021-04-27 RX ORDER — PREGABALIN 150 MG/1
150 CAPSULE ORAL 2 TIMES DAILY
Qty: 60 CAP | Refills: 2 | Status: SHIPPED | OUTPATIENT
Start: 2021-04-27 | End: 2021-06-01 | Stop reason: SDUPTHER

## 2021-04-27 NOTE — TELEPHONE ENCOUNTER
Not sure why it wasn't entered at her appointment. I have entered it now. She is not schedule for her block until 6.01.2021, so I added 2 refills.

## 2021-05-05 ENCOUNTER — OFFICE VISIT (OUTPATIENT)
Dept: ORTHOPEDIC SURGERY | Age: 31
End: 2021-05-05
Payer: COMMERCIAL

## 2021-05-05 DIAGNOSIS — G56.21 CUBITAL TUNNEL SYNDROME ON RIGHT: Primary | ICD-10-CM

## 2021-05-05 PROCEDURE — 99213 OFFICE O/P EST LOW 20 MIN: CPT | Performed by: ORTHOPAEDIC SURGERY

## 2021-05-05 RX ORDER — METHYLPREDNISOLONE 4 MG/1
TABLET ORAL
Qty: 1 DOSE PACK | Refills: 0 | Status: SHIPPED | OUTPATIENT
Start: 2021-05-05 | End: 2021-06-01 | Stop reason: ALTCHOICE

## 2021-05-26 ENCOUNTER — DOCUMENTATION ONLY (OUTPATIENT)
Dept: ORTHOPEDIC SURGERY | Age: 31
End: 2021-05-26

## 2021-05-26 ENCOUNTER — TELEPHONE (OUTPATIENT)
Dept: ORTHOPEDIC SURGERY | Age: 31
End: 2021-05-26

## 2021-05-26 NOTE — TELEPHONE ENCOUNTER
Patient's insurance denied SI joint because of the following:   Has to have 3 positive tests of the following:    Patricks test  Thigh Thrust  Si Compression Test  Yeoman's test  Gaensian's Test    Also, it needs to be documented that patient is to continue her HEP program after the injection.  Patient will be in on 6/1 for office visit to do these Tests so that we can re submit for ins auth and get her back on the schedule

## 2021-05-27 NOTE — PROGRESS NOTES
[facility]  [address], [place]   Phone: [phone]  Fax: [phone]        PROGRESS NOTE      HISTORY OF PRESENT ILLNESS:  The patient is a 31 y.o. female and was seen today for follow up of low back pain radiating into the RLE in an unclear distribution to the foot involving the digits x 5 years, constant x 12/2019. Her pain is exacerbated by standing and sitting. She has treated with Flexeril and Ibuprofen. Pt reports intolerance to NEURONTIN. Pt previously took Topamax for migraine HA's. She discontinued the medication due to resolution of migraine HA's. She recalls tolerating the medication. Pt completed PT 2-3 years ago. She received chiropractic treatment 2 years ago. She is inconsistent with her HEP. Patient denies previous spinal surgery or injections. Pt denies change in bowel or bladder habits. Pt denies fever, weight loss, or skin changes. She denies possibility of pregnancy or breastfeeding. Patient denies history of glaucoma. She was previously seen by Dr. Felecia Stacy. Pt is a nonsmoker. The patient is RHD. PmHx of obesity, peptic ulcer disease, anxiety, migraine LAROSE's. Note from CAROLINE Shelton dated 1/8/2020 indicating patient was seen with c/o right-sided low back pain with right sided sciatica x 6 years following a fall. Treated with Flexeril and Ibuprofen. Pain was 10/10. Note from Jasmin Leal dated 1/8/2020 indicating patient was seen with c/o right-sided low back pain. Treated with Flexeril. Recommended she take Ibuprofen. Referred to Orthopedics. Note from CAROLINE Lombardi dated 2/7/2020 indicating patient's pain was 0/10. Some relief with Flexeril. Noted small no impingement disc protrusion L5-S1. Note from Dr. Mariee dated 2/17/2020 indicating patient was seen with c/o righ-sided buttocks pain. Pain was 5/10. Worse with standing. Taking Neurontin. Pain in roughly L4 or L5 distribution.  Indicated he wanted to do a right piriformis injection. Pt reports she did not follow through with injection. Note from Jabari Britt [facility] 8/14/2020 indicating patient was seen with c/o pain on the medial aspect of the right foot. Worse with walking. Improved with rest. Minimal relief with Flexeril and Ibuprofen. Treated with Steroids and recommended a unna boot. L spine XR dated 1/8/2020 films not independently reviewed. Per report, no significant abnormality. L spine MRI dated 2/3/2020 films independently reviewed. Per report, small non-impinging posterior disc protrusion L5-S1. Mild lower lumbar degenerative facet changes, no other disc abnormality stenosis or impingement. RLE EMG dated 12/29/2020 by Dr. Caryn Tse indicated though her symptoms are fairly typical and likely coming from radiculopathy, there is no electrodiagnostic evidence to suggest radiculopathy on this examination. At her last clinical appointment, clinically, her symptoms were consistent with SI joint dysfunction. Pt elected to proceed with injection. I ordered a right SI joint injection. She continued on Lyrica 150 mg BID and I provided her refills.       The patient returns today and reports pain location and distribution remains unchanged. She rates her pain 2-9/10, unchanged. Pt's insurance denied the right SI joint injection. They are requesting additional testing. She continues on Lyrica 150 mg BID. She is compliant with her HEP. Pt denies change in bowel or bladder habits. Note from Dr. Lilliam Gaxiola dated 5/5/2021 indicating patient was seen with c/o right hand paraesthesias in an ulnar nerve distribution. Started on MDP.  reviewed. Body mass index is 39.49 kg/m².     PCP: CAROLINE Alexander      Past Medical History:   Diagnosis Date    Anxiety     GERD (gastroesophageal reflux disease)     sees a gastroenterologist    Hypertension     Migraine     sees neurologist    Pap smear     sees OB/GYN    Peptic ulcer, unspecified site, unspecified as acute or chronic, without mention of hemorrhage or perforation     S/P endoscopy 2006    peptic ulcer    Sigmoidoscopy exam 2010    internal hemorrhoids        Social History     Socioeconomic History    Marital status: SINGLE     Spouse name: Not on file    Number of children: Not on file    Years of education: Not on file    Highest education level: Not on file   Occupational History    Occupation: dental assistant   Tobacco Use    Smoking status: Never Smoker    Smokeless tobacco: Never Used   Substance and Sexual Activity    Alcohol use: No    Drug use: No    Sexual activity: Yes     Partners: Male   Other Topics Concern    Not on file   Social History Narrative    Not on file     Social Determinants of Health     Financial Resource Strain:     Difficulty of Paying Living Expenses:    Food Insecurity:     Worried About Running Out of Food in the Last Year:     [address] in the Last Year:    Transportation Needs:     Lack of Transportation (Medical):  Lack of Transportation (Non-Medical):    Physical Activity:     Days of Exercise per Week:     Minutes of Exercise per Session:    Stress:     Feeling of Stress :    Social Connections:     Frequency of Communication with Friends and Family:     Frequency of Social Gatherings with Friends and Family:     Attends Congregational Services:     Active Member of Clubs or Organizations:     Attends Club or Organization Meetings:     Marital Status:    Intimate Partner Violence:     Fear of Current or Ex-Partner:     Emotionally Abused:     Physically Abused:     Sexually Abused:        Current Outpatient Medications   Medication Sig Dispense Refill    ALPRAZolam (XANAX) 0.25 mg tablet ONE DAILY AS NEEDED PANIC ATTACK      sertraline (ZOLOFT) 50 mg tablet 100 mg.  pregabalin (Lyrica) 150 mg capsule Take 1 Cap by mouth two (2) times a day.  Max Daily Amount: 300 mg. 60 Cap 1    ibuprofen (MOTRIN) 800 mg tablet TAKE 1 TAB BY MOUTH TWO (2) TIMES DAILY AS NEEDED FOR PAIN. 60 Tab 1    lansoprazole (PREVACID) 30 mg capsule TAKE 1 CAPSULE BY MOUTH TWICE A DAY**SPECIFIC NDC PER INSURANCE         Allergies   Allergen Reactions    Latex Rash    Pcn [Penicillins] Hives, Rash and Swelling          PHYSICAL EXAMINATION    Visit Vitals  BP (!) 127/91 (BP 1 Location: Left upper arm)   Pulse 81   Temp 98.1 °F (36.7 °C)   Resp 17   Ht 5' 1\" (1.549 m)   Wt 209 lb (94.8 kg)   SpO2 100%   BMI 39.49 kg/m²       CONSTITUTIONAL: NAD, A&O x 3  SENSATION: Intact to light touch throughout  RANGE OF MOTION: The patient has full passive range of motion in all four extremities. MOTOR:  Straight Leg Raise: Negative, bilateral   Patricks test: Positive, right  Thigh thrust: Positive, right  SI compression test: Positive, right               Hip Flex Knee Ext Knee Flex Ankle DF GTE Ankle PF Tone   Right +4/5 +4/5 +4/5 +4/5 +4/5 +4/5 +4/5   Left +4/5 +4/5 +4/5 +4/5 +4/5 +4/5 +4/5       ASSESSMENT   Diagnoses and all orders for this visit:    1. HNP (herniated nucleus pulposus), lumbar    2. Lumbar neuritis    3. DDD (degenerative disc disease), lumbar    4. Severe obesity ([address]    5. Sacroiliitis ([address]      IMPRESSION AND PLAN:  Patient returns to the office today with c/o low back pain radiating into the RLE in an unclear distribution to the foot involving the digits. Multiple treatment options were discussed. At her last clinical appointment, I ordered a right sided SI joint injection which was denied by her insurance. Pt was brought back in today for additional physical examination. Pt still wishes to proceed with SI joint injection. I again ordered a right sided SI joint injection. She should continue on the Lyrica 150 mg BID and did not require refills. I encouraged her to continue to perform her daily HEP. Patient is neurologically intact. I will see the patient back following the block or earlier if needed.       Written by Mansi Camarena, as dictated by Susan Palmer Brad Montejo MD  I examined the patient, reviewed and agree with the note.

## 2021-06-01 ENCOUNTER — OFFICE VISIT (OUTPATIENT)
Dept: ORTHOPEDIC SURGERY | Age: 31
End: 2021-06-01
Payer: COMMERCIAL

## 2021-06-01 VITALS
SYSTOLIC BLOOD PRESSURE: 127 MMHG | DIASTOLIC BLOOD PRESSURE: 91 MMHG | TEMPERATURE: 98.1 F | OXYGEN SATURATION: 100 % | WEIGHT: 209 LBS | RESPIRATION RATE: 17 BRPM | BODY MASS INDEX: 39.46 KG/M2 | HEART RATE: 81 BPM | HEIGHT: 61 IN

## 2021-06-01 DIAGNOSIS — M51.26 HNP (HERNIATED NUCLEUS PULPOSUS), LUMBAR: Primary | ICD-10-CM

## 2021-06-01 DIAGNOSIS — M51.36 DDD (DEGENERATIVE DISC DISEASE), LUMBAR: ICD-10-CM

## 2021-06-01 DIAGNOSIS — M46.1 SACROILIITIS (HCC): ICD-10-CM

## 2021-06-01 DIAGNOSIS — E66.01 SEVERE OBESITY (HCC): ICD-10-CM

## 2021-06-01 DIAGNOSIS — M54.16 LUMBAR NEURITIS: ICD-10-CM

## 2021-06-01 PROCEDURE — 99213 OFFICE O/P EST LOW 20 MIN: CPT | Performed by: PHYSICAL MEDICINE & REHABILITATION

## 2021-06-01 RX ORDER — ALPRAZOLAM 0.25 MG/1
TABLET ORAL
COMMUNITY
Start: 2021-04-06

## 2021-06-01 RX ORDER — METHOCARBAMOL 750 MG/1
TABLET, FILM COATED ORAL
COMMUNITY
Start: 2021-04-20 | End: 2021-06-01 | Stop reason: ALTCHOICE

## 2021-06-01 RX ORDER — FLUTICASONE PROPIONATE 50 MCG
SPRAY, SUSPENSION (ML) NASAL
COMMUNITY
End: 2021-06-01 | Stop reason: ALTCHOICE

## 2021-06-01 RX ORDER — NAPROXEN 500 MG/1
TABLET ORAL
COMMUNITY
End: 2021-06-01 | Stop reason: ALTCHOICE

## 2021-06-01 RX ORDER — PROPRANOLOL HYDROCHLORIDE 20 MG/1
20 TABLET ORAL
COMMUNITY
Start: 2021-03-19 | End: 2021-06-01 | Stop reason: ALTCHOICE

## 2021-06-01 NOTE — LETTER
6/1/2021    Patient: Krystina Keen   YOB: 1990   Date of Visit: 6/1/2021     Honorio Cesar, [address] [facility] [address]  Via Fax: [phone]    Dear CAROLINE Case,      Thank you for referring Ms. Aftab Valadez to [address] for evaluation. My notes for this consultation are attached. If you have questions, please do not hesitate to call me. I look forward to following your patient along with you.       Sincerely,    Miladys Sharma MD

## 2021-07-01 ENCOUNTER — OFFICE VISIT (OUTPATIENT)
Dept: ORTHOPEDIC SURGERY | Age: 31
End: 2021-07-01
Payer: COMMERCIAL

## 2021-07-01 VITALS
RESPIRATION RATE: 16 BRPM | HEART RATE: 101 BPM | BODY MASS INDEX: 38.92 KG/M2 | WEIGHT: 206 LBS | DIASTOLIC BLOOD PRESSURE: 88 MMHG | OXYGEN SATURATION: 98 % | SYSTOLIC BLOOD PRESSURE: 125 MMHG

## 2021-07-01 DIAGNOSIS — E66.01 SEVERE OBESITY (HCC): ICD-10-CM

## 2021-07-01 DIAGNOSIS — M46.1 SACROILIITIS (HCC): ICD-10-CM

## 2021-07-01 DIAGNOSIS — M51.26 HNP (HERNIATED NUCLEUS PULPOSUS), LUMBAR: Primary | ICD-10-CM

## 2021-07-01 DIAGNOSIS — M54.16 LUMBAR NEURITIS: ICD-10-CM

## 2021-07-01 DIAGNOSIS — M51.36 DDD (DEGENERATIVE DISC DISEASE), LUMBAR: ICD-10-CM

## 2021-07-01 PROCEDURE — 99213 OFFICE O/P EST LOW 20 MIN: CPT | Performed by: PHYSICAL MEDICINE & REHABILITATION

## 2021-07-01 RX ORDER — PREGABALIN 150 MG/1
150 CAPSULE ORAL 2 TIMES DAILY
Qty: 180 CAPSULE | Refills: 0 | Status: SHIPPED | OUTPATIENT
Start: 2021-07-01 | End: 2021-11-23

## 2021-07-01 RX ORDER — LORAZEPAM 0.5 MG/1
TABLET ORAL
COMMUNITY
Start: 2021-06-25

## 2021-07-01 NOTE — PROGRESS NOTES
They changed her appointment so its not precharted yet try to prechart and document          Ruben Cobb [facility]  [address], [place]   Phone: [phone]  Fax: [phone]        PROGRESS NOTE      HISTORY OF PRESENT ILLNESS:  The patient is a 31 y.o. female and was seen today for follow up of low back pain radiating into the RLE in an unclear distribution to the foot involving the digits x 5 years, constant x 12/2019. Her pain is exacerbated by standing and sitting. She has treated with Flexeril and Ibuprofen. Pt reports intolerance to NEURONTIN. Pt previously took Topamax for migraine HA's. She discontinued the medication due to resolution of migraine HA's. She recalls tolerating the medication. Pt completed PT 2-3 years ago. She received chiropractic treatment 2 years ago. She is inconsistent with her HEP. Patient denies previous spinal surgery or injections. Pt denies change in bowel or bladder habits. Pt denies fever, weight loss, or skin changes. She denies possibility of pregnancy or breastfeeding. Patient denies history of glaucoma. She was previously seen by Dr. Angel Cristina. Pt is a nonsmoker. The patient is RHD. PmHx of obesity, peptic ulcer disease, anxiety, migraine LAROSE's. Note from CAROLINE Shelton dated 1/8/2020 indicating patient was seen with c/o right-sided low back pain with right sided sciatica x 6 years following a fall. Treated with Flexeril and Ibuprofen. Pain was 10/10. Note from Jasmni Leal dated 1/8/2020 indicating patient was seen with c/o right-sided low back pain. Treated with Flexeril. Recommended she take Ibuprofen. Referred to Orthopedics. Note from CAROLINE Lombardi dated 2/7/2020 indicating patient's pain was 0/10. Some relief with Flexeril. Noted small no impingement disc protrusion L5-S1. Note from Dr. Mariee dated 2/17/2020 indicating patient was seen with c/o righ-sided buttocks pain. Pain was 5/10. Worse with standing.  Taking Neurontin. Pain in roughly L4 or L5 distribution. Indicated he wanted to do a right piriformis injection. Pt reports she did not follow through with injection. Note from Jabari Jimenes [facility] 8/14/2020 indicating patient was seen with c/o pain on the medial aspect of the right foot. Worse with walking. Improved with rest. Minimal relief with Flexeril and Ibuprofen. Treated with Steroids and recommended a unna boot. Note from Dr. Tama Landau dated 5/5/2021 indicating patient was seen with c/o right hand paraesthesias in an ulnar nerve distribution. Started on MDP. L spine XR dated 1/8/2020 films not independently reviewed. Per report, no significant abnormality. L spine MRI dated 2/3/2020 films independently reviewed. Per report, small non-impinging posterior disc protrusion L5-S1. Mild lower lumbar degenerative facet changes, no other disc abnormality stenosis or impingement. RLE EMG dated 12/29/2020 by Dr. Madison Doss indicated though her symptoms are fairly typical and likely coming from radiculopathy, there is no electrodiagnostic evidence to suggest radiculopathy on this examination. At her last clinical appointment, clinically,I ordered a right sided SI joint injection which was denied by her insurance. Pt was brought back in today for additional physical examination. Pt still wishes to proceed with SI joint injection. I again ordered a right sided SI joint injection. She should continue on the Lyrica 150 mg BID and did not require refills. I encouraged her to continue to perform her daily HEP. Patient is neurologically intact. I will see the patient back following the block or earlier if needed       The patient returns today and reports pain location and distribution remains unchanged. She rates her pain 3/10, previously 2-9/10. Pt underwent right sided SI joint injection on 6/15/2021 with resolution of her symptoms x 2 weeks. Pt reports no restrictions on her activities and was essentially pain free.  Pt notes an increase in pain within the last 24 hours without trauma. Pt is tolerating the Lyrica 150 mg with benefit. Pt is inconsistent with her HEP. I encouraged her to continue to perform her daily HEP. Pt denies change in bowel or bladder habits.  reviewed. Body mass index is 38.92 kg/m². PCP: CAROLINE Man      Past Medical History:   Diagnosis Date    Anxiety     GERD (gastroesophageal reflux disease)     sees a gastroenterologist    Hypertension     Migraine     sees neurologist    Pap smear     sees OB/GYN    Peptic ulcer, unspecified site, unspecified as acute or chronic, without mention of hemorrhage or perforation     S/P endoscopy 2006    peptic ulcer    Sigmoidoscopy exam 2010    internal hemorrhoids        Social History     Socioeconomic History    Marital status: SINGLE     Spouse name: Not on file    Number of children: Not on file    Years of education: Not on file    Highest education level: Not on file   Occupational History    Occupation: dental assistant   Tobacco Use    Smoking status: Never Smoker    Smokeless tobacco: Never Used   Substance and Sexual Activity    Alcohol use: No    Drug use: No    Sexual activity: Yes     Partners: Male   Other Topics Concern    Not on file   Social History Narrative    Not on file     Social Determinants of Health     Financial Resource Strain:     Difficulty of Paying Living Expenses:    Food Insecurity:     Worried About [address] in the Last Year:     [address] in the Last Year:    Transportation Needs:     Lack of Transportation (Medical):      Lack of Transportation (Non-Medical):    Physical Activity:     Days of Exercise per Week:     Minutes of Exercise per Session:    Stress:     Feeling of Stress :    Social Connections:     Frequency of Communication with Friends and Family:     Frequency of Social Gatherings with Friends and Family:     Attends Congregational Services:     Active Member of Clubs or Organizations:     Attends Club or Organization Meetings:     Marital Status:    Intimate Partner Violence:     Fear of Current or Ex-Partner:     Emotionally Abused:     Physically Abused:     Sexually Abused:        Current Outpatient Medications   Medication Sig Dispense Refill    ALPRAZolam (XANAX) 0.25 mg tablet ONE DAILY AS NEEDED PANIC ATTACK      sertraline (ZOLOFT) 50 mg tablet 100 mg.  pregabalin (Lyrica) 150 mg capsule Take 1 Cap by mouth two (2) times a day. Max Daily Amount: 300 mg. 60 Cap 1    ibuprofen (MOTRIN) 800 mg tablet TAKE 1 TAB BY MOUTH TWO (2) TIMES DAILY AS NEEDED FOR PAIN. 60 Tab 1    lansoprazole (PREVACID) 30 mg capsule TAKE 1 CAPSULE BY MOUTH TWICE A DAY**SPECIFIC NDC PER INSURANCE      LORazepam (ATIVAN) 0.5 mg tablet          Allergies   Allergen Reactions    Latex Rash    Pcn [Penicillins] Hives, Rash and Swelling          PHYSICAL EXAMINATION    Visit Vitals  /88   Pulse (!) 101   Resp 16   Wt 206 lb (93.4 kg)   SpO2 98%   BMI 38.92 kg/m²       CONSTITUTIONAL: NAD, A&O x 3  SENSATION: Intact to light touch throughout  RANGE OF MOTION: The patient has full passive range of motion in all four extremities. MOTOR:  Straight Leg Raise: Negative, bilateral               Hip Flex Knee Ext Knee Flex Ankle DF GTE Ankle PF Tone   Right +4/5 +4/5 +4/5 +4/5 +4/5 +4/5 +4/5   Left +4/5 +4/5 +4/5 +4/5 +4/5 +4/5 +4/5       ASSESSMENT   Diagnoses and all orders for this visit:    1. HNP (herniated nucleus pulposus), lumbar  -     pregabalin (Lyrica) 150 mg capsule; Take 1 Capsule by mouth two (2) times a day. Max Daily Amount: 300 mg.    2. Lumbar neuritis  -     pregabalin (Lyrica) 150 mg capsule; Take 1 Capsule by mouth two (2) times a day. Max Daily Amount: 300 mg.    3. DDD (degenerative disc disease), lumbar  -     pregabalin (Lyrica) 150 mg capsule; Take 1 Capsule by mouth two (2) times a day.  Max Daily Amount: 300 mg.    4. Severe obesity ([address]  - pregabalin (Lyrica) 150 mg capsule; Take 1 Capsule by mouth two (2) times a day. Max Daily Amount: 300 mg.    5. Sacroiliitis (H[facility])  -     pregabalin (Lyrica) 150 mg capsule; Take 1 Capsule by mouth two (2) times a day. Max Daily Amount: 300 mg. IMPRESSION AND PLAN:  Patient returns to the office today with c/o low back pain radiating into the RLE in an unclear distribution to the foot involving the digits  Multiple treatment options were discussed. Patient wished to continue her current treatment. I will provide her with a refill of Lyrica 150 mg BID. I encouraged her to continue to perform her daily HEP. Patient is neurologically intact. I will see the patient back in 1 month's time or earlier if needed. Written by Gavin Olvera and Burrell Meigs, as dictated by Jackson Ritter MD  I examined the patient, reviewed and agree with the note.

## 2021-07-01 NOTE — Clinical Note
7/1/2021    Patient: Yusuf Rea   YOB: 1990   Date of Visit: 7/1/2021     Berenice Hamilton  Via     Dear CAROLINE Hamilton,      Thank you for referring Ms. Jana Bolaños to [facility] for evaluation. My notes for this consultation are attached. If you have questions, please do not hesitate to call me. I look forward to following your patient along with you.       Sincerely,    Christoph Villarreal MD

## 2021-07-23 ENCOUNTER — TELEPHONE (OUTPATIENT)
Dept: ORTHOPEDIC SURGERY | Age: 31
End: 2021-07-23

## 2021-07-23 NOTE — TELEPHONE ENCOUNTER
Returned patient call [phone] and verified her name and date of birth. I informed her that I have spoken to provider and he recommends she take the Lyrica 150 mg three times a day. Patient stated ever since she had the injection her pain has increased. Patient stated this not just sciatic pain because it is now going into her groin area and into her hip when the bones meet. Patient stated she has had to take Percocet for her pain. Patient then stated she went a vacation that Sunday after she was seen and she was in a lot of pain and discomfort. And she had to take another Percocet. I then inquired if the Percocet worked. She responded yes the Percocet is helping and that it usual does not help for her but it is now. I informed her that the provider has left for the day and will be back in the office on Monday and I will send him this message and once I get a response I will contact her and if we need to make an earlier appointment than 8/3 we can do that at that time. Patient verbalized understanding and stated I will go ahead and start taking the Lyrica three times a day. Please advise.

## 2021-07-23 NOTE — TELEPHONE ENCOUNTER
Patient called back in regards to earlier messages left. States she has yet to receive a call back and she is in pain. Tried to contact nurse directly to inquire about notes but no answer. I did make attempt to speak with patient but call was lost.     Patient is still awaiting a response.  Please advise    [facility] # [phone]

## 2021-07-23 NOTE — TELEPHONE ENCOUNTER
[phone] Patient    Patient states she is still having trouble with her sciatic nerve. She said she was told to call if she had continued problems. Please return call.

## 2021-07-26 ENCOUNTER — TELEPHONE (OUTPATIENT)
Dept: ORTHOPEDIC SURGERY | Age: 31
End: 2021-07-26

## 2021-07-26 DIAGNOSIS — M54.16 LUMBAR NEURITIS: ICD-10-CM

## 2021-07-26 DIAGNOSIS — M46.1 SACROILIITIS (HCC): ICD-10-CM

## 2021-07-26 DIAGNOSIS — M51.36 DDD (DEGENERATIVE DISC DISEASE), LUMBAR: ICD-10-CM

## 2021-07-26 DIAGNOSIS — M51.26 HNP (HERNIATED NUCLEUS PULPOSUS), LUMBAR: Primary | ICD-10-CM

## 2021-07-26 NOTE — TELEPHONE ENCOUNTER
Called patient [phone] and left voice mail asking patient to call the office back. I was calling to inquire how she was doing in regards to our previous message.

## 2021-07-26 NOTE — TELEPHONE ENCOUNTER
Called patient [phone] and left voice mail asking patient to call the office back. I was calling to inquire how she was doing in regards to our previous message.

## 2021-07-26 NOTE — TELEPHONE ENCOUNTER
Another encounter has been open in regards to this message. I am closing this message and I will continue documenting in the newest message.

## 2021-07-26 NOTE — TELEPHONE ENCOUNTER
Called patient [phone] and verified her name and date of birth. I informed her that I was calling to follow up with her per the provider to see how she is doing since Friday. Patient stated she is doing the same. Patient stated the pain is the still the same. I inquired if she was still taking the percocet. Patient stated she was unable to take it earlier because she was at work. She also states she has her 3year old and she was at work. She stated she works at [employer] where she has to do a lot of standing and walking where the pain is the worse. I informed her that I will forward the message to the provider and once he gives a response I will call her back. Patient verbalized understanding. Please advise.

## 2021-07-26 NOTE — TELEPHONE ENCOUNTER
Returned patient call [phone] and verified her name and date of birth. I informed her that I have spoken to provider and he recommends she take the Lyrica 150 mg three times a day. Patient stated ever since she had the injection her pain has increased. Patient stated this not just sciatic pain because it is now going into her groin area and into her hip when the bones meet. Patient stated she has had to take Percocet for her pain. Patient then stated she went a vacation that Sunday after she was seen and she was in a lot of pain and discomfort. And she had to take another Percocet. I then inquired if the Percocet worked. She responded yes the Percocet is helping and that it usual does not help for her but it is now. I informed her that the provider has left for the day and will be back in the office on Monday and I will send him this message and once I get a response I will contact her and if we need to make an earlier appointment than 8/3 we can do that at that time. Patient verbalized understanding and stated I will go ahead and start taking the Lyrica three times a day. Please advise. Pt calling back in regards to above message. States she continues to miss call.  Requesting another call back

## 2021-07-26 NOTE — TELEPHONE ENCOUNTER
Called patient and informed her per the provider that it has been over a year since she had her MRI done and she is having increased pain. That he would recommend a MRI Lumbar spine and to continue taking the Lyrica 150 mg TID because it is the best option for the sciatic pain. I inquired where she would like to go for her MR. Patient stated [facility]. I informed her we will put the order and they will contact her. Patient stated she has an appointment on 8/4/2021 and wanted to know if that would be okay. I informed her I am not sure but since she works at [facility] maybe she could try and speak to them.

## 2021-07-28 ENCOUNTER — TELEPHONE (OUTPATIENT)
Dept: ORTHOPEDIC SURGERY | Age: 31
End: 2021-07-28

## 2021-07-28 NOTE — TELEPHONE ENCOUNTER
MRI of the Lumbar Spine without contrast is scheduled at [facility], [phone], on 08/08/21, arrive 6:30AM, test 7:00AM. [employer] pre-authorization is pending review and facility selection by the patient. Clinicals have been faxed as requested to [phone], Case# [id number]. I left a generic message for Ms. Rangel requesting a return call. She needs MRI appt information. Also, I rescheduled appt with Dr Dean Freire and she will need appt information.

## 2021-08-02 NOTE — TELEPHONE ENCOUNTER
MRI of the Lumbar Spine has been denied by [employer]. They state there is no follow-up contact with doctor after completing treatment. The last 2 office notes were previously faxed. A peer to peer review can be completed by calling [employer] at [phone] no later than 08/04/21 at 4:00PM. Patient ID [id number], Ref# [id number]. If approved, please obtain the authorization number and effective dates.

## 2021-08-03 NOTE — TELEPHONE ENCOUNTER
Is it to late to send the telephone encounters over?  He followed up via a phone conservation with dimas/ Dr. Bren Momin

## 2021-08-04 NOTE — TELEPHONE ENCOUNTER
[facility] called stating they don't have the order for the patient's MRI scheduled for 08/08/21. They're requesting this be faxed to them today at fax number [phone]. Please advise.

## 2021-08-24 NOTE — PROGRESS NOTES
[facility]  [address], [place]   Phone: [phone]  Fax: [phone]        PROGRESS NOTE      HISTORY OF PRESENT ILLNESS:  The patient is a 27 y.o. female and was seen today for follow up of  low back pain radiating into the RLE in an unclear distribution to the foot involving the digits x 5 years, constant x 12/2019. Her pain is exacerbated by standing and sitting. She has treated with Flexeril and Ibuprofen. Pt reports intolerance to NEURONTIN. Pt previously took Topamax for migraine HA's. She discontinued the medication due to resolution of migraine HA's. She recalls tolerating the medication. Pt completed PT 2-3 years ago. She received chiropractic treatment 2 years ago. She is inconsistent with her HEP. Patient denies previous spinal surgery or injections. Pt denies change in bowel or bladder habits. Pt denies fever, weight loss, or skin changes. She denies possibility of pregnancy or breastfeeding. Patient denies history of glaucoma. She was previously seen by Dr. uSjey Cheema. Pt is a nonsmoker. The patient is RHD. PmHx of obesity, peptic ulcer disease, anxiety, migraine HA's. Pt underwent right sided SI joint injection on 6/15/2021 with resolution of her symptoms x 2 weeks. Note from CAROLINE Wong dated 1/8/2020 indicating patient was seen with c/o right-sided low back pain with right sided sciatica x 6 years following a fall. Treated with Flexeril and Ibuprofen. Pain was 10/10. Note from Jasmin Leal dated 1/8/2020 indicating patient was seen with c/o right-sided low back pain. Treated with Flexeril. Recommended she take Ibuprofen. Referred to Orthopedics. Note from CAROLINE Lombardi dated 2/7/2020 indicating patient's pain was 0/10. Some relief with Flexeril. Noted small no impingement disc protrusion L5-S1. Note from Dr. Mariee dated 2/17/2020 indicating patient was seen with c/o righ-sided buttocks pain. Pain was 5/10. Worse with standing. Taking Neurontin. Pain in roughly L4 or L5 distribution. Indicated he wanted to do a right piriformis injection. Pt reports she did not follow through with injection. Note from Jabari Cosme [facility] 8/14/2020 indicating patient was seen with c/o pain on the medial aspect of the right foot. Worse with walking. Improved with rest. Minimal relief with Flexeril and Ibuprofen. Treated with Steroids and recommended a unna boot. Note from Dr. Caro Brandon 5/5/2021 indicating patient was seen with c/o right hand paraesthesias in an ulnar nerve distribution. Started on MDP. L spine XR dated 1/8/2020 films not independently reviewed. Per report, no significant abnormality. L spine MRI dated 2/3/2020 films independently reviewed. Per report, small non-impinging posterior disc protrusion L5-S1. Mild lower lumbar degenerative facet changes, no other disc abnormality stenosis or impingement. RLE EMG dated 12/29/2020 by Dr. Adelia Marrero indicated though her symptoms are fairly typical and likely coming from radiculopathy, there is no electrodiagnostic evidence to suggest radiculopathy on this examination. At her last clinical appointment, clinically,I ordered a right sided SI joint injection which was denied by her insurance. Pt was brought back in today for additional physical examination. Pt still wishes to proceed with SI joint injection.  I again ordered a right sided SI joint injection. She should continue on the Lyrica 150 mg BID and did not require refills. I encouraged her to continue to perform her daily HEP. Patient is neurologically intact. I will see the patient back following the block or earlier if needed        The patient returns today with right sided buttocks pain radiating into the RLE into the mid thigh in a L4 distribution. She additionally endorses numbness and tingling of the right foot. She rates her pain 2-7/10, previously 3/10.  She reports her pain is worse in the AM. Pt reports after she left the office last visit she experienced an increase in pain following a vacation. She reports she had called our office on 7/26/2021 with an increase in groin and hip pain since the previous right sided sacroiliac injection from 6/15/2021. She stated she had to take Percocet and responded well to the medication with relief. She had been increased from Lyrica 150 mg BID to 150 mg TID with some relief. L spine MRI dated 8/9/2021 films independently reviewed. Per report,Small, broad-based central disc protrusion at L5-S1, as described above. Otherwise, unremarkable evaluation. Pt denies change in bowel or bladder habits.  reviewed. Body mass index is 39.6 kg/m².     PCP: CAROLINE Teresa      Past Medical History:   Diagnosis Date    Anxiety     GERD (gastroesophageal reflux disease)     sees a gastroenterologist    Hypertension     Migraine     sees neurologist    Pap smear     sees OB/GYN    Peptic ulcer, unspecified site, unspecified as acute or chronic, without mention of hemorrhage or perforation     S/P endoscopy 2006    peptic ulcer    Sigmoidoscopy exam 2010    internal hemorrhoids        Social History     Socioeconomic History    Marital status: SINGLE     Spouse name: Not on file    Number of children: Not on file    Years of education: Not on file    Highest education level: Not on file   Occupational History    Occupation: dental assistant   Tobacco Use    Smoking status: Never Smoker    Smokeless tobacco: Never Used   Substance and Sexual Activity    Alcohol use: No    Drug use: No    Sexual activity: Yes     Partners: Male   Other Topics Concern    Not on file   Social History Narrative    Not on file     Social Determinants of Health     Financial Resource Strain:     Difficulty of Paying Living Expenses:    Food Insecurity:     Worried About [address] in the Last Year:     [address] in the Last Year:    Transportation Needs:     Lack of Transportation (Medical):  Lack of Transportation (Non-Medical):    Physical Activity:     Days of Exercise per Week:     Minutes of Exercise per Session:    Stress:     Feeling of Stress :    Social Connections:     Frequency of Communication with Friends and Family:     Frequency of Social Gatherings with Friends and Family:     Attends Mosque Services:     Active Member of Clubs or Organizations:     Attends Club or Organization Meetings:     Marital Status:    Intimate Partner Violence:     Fear of Current or Ex-Partner:     Emotionally Abused:     Physically Abused:     Sexually Abused:        Current Outpatient Medications   Medication Sig Dispense Refill    LORazepam (ATIVAN) 0.5 mg tablet       pregabalin (Lyrica) 150 mg capsule Take 1 Capsule by mouth two (2) times a day. Max Daily Amount: 300 mg. 180 Capsule 0    ALPRAZolam (XANAX) 0.25 mg tablet ONE DAILY AS NEEDED PANIC ATTACK      sertraline (ZOLOFT) 50 mg tablet 100 mg.  pregabalin (Lyrica) 150 mg capsule Take 1 Cap by mouth two (2) times a day. Max Daily Amount: 300 mg. 60 Cap 1    ibuprofen (MOTRIN) 800 mg tablet TAKE 1 TAB BY MOUTH TWO (2) TIMES DAILY AS NEEDED FOR PAIN. 60 Tab 1    lansoprazole (PREVACID) 30 mg capsule TAKE 1 CAPSULE BY MOUTH TWICE A DAY**SPECIFIC NDC PER INSURANCE      ergocalciferol (ERGOCALCIFEROL) 1,250 mcg (50,000 unit) capsule TAKE 1 CAPSULE EVERY WEEK BY ORAL ROUTE. Allergies   Allergen Reactions    Latex Rash    Pcn [Penicillins] Hives, Rash and Swelling          PHYSICAL EXAMINATION    Visit Vitals  /83 (BP 1 Location: Left arm, BP Patient Position: Sitting)   Pulse 89   Temp 97.8 °F (36.6 °C) (Temporal)   Resp 16   Ht 5' 1\" (1.549 m)   Wt 209 lb 9.6 oz (95.1 kg)   SpO2 97%   BMI 39.60 kg/m²       CONSTITUTIONAL: NAD, A&O x 3  SENSATION: Intact to light touch throughout  RANGE OF MOTION: The patient has full passive range of motion in all four extremities.   MOTOR:  Straight Leg Raise: Negative, bilateral    Increase in groin pain with internal rotation of the right hip               Hip Flex Knee Ext Knee Flex Ankle DF GTE Ankle PF Tone   Right +4/5 +4/5 +4/5 +4/5 +4/5 +4/5 +4/5   Left +4/5 +4/5 +4/5 +4/5 +4/5 +4/5 +4/5       ASSESSMENT   Diagnoses and all orders for this visit:    1. Lumbar neuritis  -     EMG ONE EXTREMITY LOWER RT; Future  -     pregabalin (LYRICA) 300 mg capsule; Take 1 Capsule by mouth two (2) times a day. Max Daily Amount: 600 mg.    2. DDD (degenerative disc disease), lumbar    3. HNP (herniated nucleus pulposus), lumbar    4. Sacroiliitis (HCC)  -     REFERRAL TO ORTHOPEDICS    5. Severe obesity ([address]    6. Right hip pain  -     REFERRAL TO ORTHOPEDICS      IMPRESSION AND PLAN:  Patient returns to the office today with c/o right sided buttocks pain radiating into the RLE into the mid thigh in a L4 distribution. She additionally endorses numbness and tingling of the right foot. Multiple treatment options were discussed. I had previously discussed a consideration of a discogram. I will order a repeat RLE EMG with Dr. Mir Olmos. Based on physical examination, pt experienced increased groin pain with internal rotation of right hip which I suspect to be related to hip pathology. I will refer her to orthopedics for her right hip. I will change her prescription from Lyrica 150 TID to Lyrica 300 mg BID. Patient advised to call the office if intolerant to medication. Patient is neurologically intact. I will see the patient back following EMG and orthopedic evaluation or earlier if needed. Written by bContextmykel Borrero, as dictated by Graciela Hurley MD  I examined the patient, reviewed and agree with the note.

## 2021-08-25 ENCOUNTER — OFFICE VISIT (OUTPATIENT)
Dept: ORTHOPEDIC SURGERY | Age: 31
End: 2021-08-25
Payer: COMMERCIAL

## 2021-08-25 VITALS
SYSTOLIC BLOOD PRESSURE: 119 MMHG | RESPIRATION RATE: 16 BRPM | BODY MASS INDEX: 39.57 KG/M2 | DIASTOLIC BLOOD PRESSURE: 83 MMHG | TEMPERATURE: 97.8 F | HEIGHT: 61 IN | OXYGEN SATURATION: 97 % | WEIGHT: 209.6 LBS | HEART RATE: 89 BPM

## 2021-08-25 DIAGNOSIS — M25.551 RIGHT HIP PAIN: ICD-10-CM

## 2021-08-25 DIAGNOSIS — E66.01 SEVERE OBESITY (HCC): ICD-10-CM

## 2021-08-25 DIAGNOSIS — M51.36 DDD (DEGENERATIVE DISC DISEASE), LUMBAR: ICD-10-CM

## 2021-08-25 DIAGNOSIS — M54.16 LUMBAR NEURITIS: Primary | ICD-10-CM

## 2021-08-25 DIAGNOSIS — M51.26 HNP (HERNIATED NUCLEUS PULPOSUS), LUMBAR: ICD-10-CM

## 2021-08-25 DIAGNOSIS — M46.1 SACROILIITIS (HCC): ICD-10-CM

## 2021-08-25 PROCEDURE — 99214 OFFICE O/P EST MOD 30 MIN: CPT | Performed by: PHYSICAL MEDICINE & REHABILITATION

## 2021-08-25 RX ORDER — PREGABALIN 300 MG/1
300 CAPSULE ORAL 2 TIMES DAILY
Qty: 60 CAPSULE | Refills: 1 | Status: SHIPPED | OUTPATIENT
Start: 2021-08-25 | End: 2021-11-23

## 2021-08-25 RX ORDER — ERGOCALCIFEROL 1.25 MG/1
CAPSULE ORAL
COMMUNITY
Start: 2021-06-16

## 2021-08-25 NOTE — Clinical Note
8/25/2021    Patient: Imtiaz Rush   YOB: 1990   Date of Visit: 8/25/2021     Ryan Gao, [address]  [address]     Dear CAROLINE Hawley,      Thank you for referring Ms. Aleksandra Zavala to [address] for evaluation. My notes for this consultation are attached. If you have questions, please do not hesitate to call me. I look forward to following your patient along with you.       Sincerely,    Piedad Mantilla MD

## 2021-09-01 DIAGNOSIS — M54.16 LUMBAR NEURITIS: ICD-10-CM

## 2021-09-20 ENCOUNTER — TELEPHONE (OUTPATIENT)
Dept: ORTHOPEDIC SURGERY | Age: 31
End: 2021-09-20

## 2021-09-20 NOTE — TELEPHONE ENCOUNTER
EMG RLE is scheduled with Dr Madison Doss, [address] [place] [address],  [phone] on 10/14/21, arrive 8:15AM, test 8:30AM.

## 2021-11-05 DIAGNOSIS — M54.16 LUMBAR NEURITIS: ICD-10-CM

## 2021-11-05 NOTE — TELEPHONE ENCOUNTER
Patient called to follow up on refill request - advised pharmacy just called in a few minutes ago. Please process as soon as possible for patient to fill.

## 2021-11-08 RX ORDER — PREGABALIN 300 MG/1
CAPSULE ORAL
Qty: 60 CAPSULE | Refills: 1 | OUTPATIENT
Start: 2021-11-08

## 2021-11-08 NOTE — TELEPHONE ENCOUNTER
Patient states she could not get to those appointments because they did not work with her schedule. She states that the medication has been working and she is not hurting like she was. She has been out of the medication all weekend but would like to know if she could have a prescription to last until her follow up with Dr Carlos Bejarano that was scheduled today for 12-1-21. I discussed this with Dr Carlos Bejarano and he states that she needs to be seen first. He has not seen her since July and none of the referrals made were followed through.

## 2021-11-08 NOTE — TELEPHONE ENCOUNTER
Needs appointment. Last seen 8/25. Ordered ortho appointment & EMG and follow up after. Don't see Ortho eval or EMG. No followup appointment scheduled.

## 2021-11-15 DIAGNOSIS — M54.16 LUMBAR NEURITIS: ICD-10-CM

## 2021-11-15 DIAGNOSIS — M46.1 SACROILIITIS (HCC): ICD-10-CM

## 2021-11-15 DIAGNOSIS — M51.36 DDD (DEGENERATIVE DISC DISEASE), LUMBAR: ICD-10-CM

## 2021-11-15 DIAGNOSIS — M51.26 HNP (HERNIATED NUCLEUS PULPOSUS), LUMBAR: ICD-10-CM

## 2021-11-17 NOTE — PROGRESS NOTES
[facility]  [address], [place]   Phone: [phone]  Fax: [phone]        PROGRESS NOTE      HISTORY OF PRESENT ILLNESS:  The patient is a 27 y.o. female and was seen today for follow up of right sided buttocks pain radiating into the RLE into the mid thigh in a L4 distribution. She additionally endorses numbness and tingling of the right foot. Previously seen for low back pain radiating into the RLE in an unclear distribution to the foot involving the digits x 5 years, constant x 12/2019. Her pain is exacerbated by standing and sitting. She has treated with Flexeril and Ibuprofen. Pt reports intolerance to NEURONTIN. Pt previously took Topamax for migraine HA's. She discontinued the medication due to resolution of migraine HA's. She recalls tolerating the medication. Pt completed PT 2-3 years ago. She received chiropractic treatment 2 years ago. She is inconsistent with her HEP. Patient denies previous spinal surgery or injections. Pt denies change in bowel or bladder habits. Pt denies fever, weight loss, or skin changes. She denies possibility of pregnancy or breastfeeding. Patient denies history of glaucoma. She was previously seen by Dr. Kirby Moore. Pt is a nonsmoker. The patient is RHD. PmHx of obesity, peptic ulcer disease, anxiety, migraine HA's. Pt underwent right sided SI joint injection on 6/15/2021 with resolution of her symptoms x 2 weeks. Note from CAROLINE Dukes dated 1/8/2020 indicating patient was seen with c/o right-sided low back pain with right sided sciatica x 6 years following a fall. Treated with Flexeril and Ibuprofen. Pain was 10/10. Note from Jasmin Leal dated 1/8/2020 indicating patient was seen with c/o right-sided low back pain. Treated with Flexeril. Recommended she take Ibuprofen. Referred to Orthopedics. Note from CAROLINE Lombardi dated 2/7/2020 indicating patient's pain was 0/10. Some relief with Flexeril.  Noted small no impingement disc protrusion L5-S1. Note from Dr. Mariee dated 2/17/2020 indicating patient was seen with c/o righ-sided buttocks pain. Pain was 5/10. Worse with standing. Taking Neurontin. Pain in roughly L4 or L5 distribution. Indicated he wanted to do a right piriformis injection. Pt reports she did not follow through with injection. Note from Jabari [facility] 8/14/2020 indicating patient was seen with c/o pain on the medial aspect of the right foot. Worse with walking. Improved with rest. Minimal relief with Flexeril and Ibuprofen. Treated with Steroids and recommended a unna boot. Note from Dr. Beverly Gonzalez 5/5/2021 indicating patient was seen with c/o right hand paraesthesias in an ulnar nerve distribution. Started on MDP. L spine [facility] 1/8/2020 films not independently reviewed. Per report, no significant abnormality.  RLE EMG dated 12/29/2020 by Dr. Rancho Chacon indicated though her symptoms are fairly typical and likely coming from radiculopathy, there is no electrodiagnostic evidence to suggest radiculopathy on this examination. L spine MRI dated 8/9/2021 films independently reviewed. Per report,Small, broad-based central disc protrusion at L5-S1, as described above. Otherwise, unremarkable evaluation. At her last clinical appointment,  I had previously discussed a consideration for a discogram. I ordered a repeat RLE EMG with Dr. Rancho Chacon. Based on physical examination, pt experienced increased groin pain with internal rotation of right hip which I suspect to be related to hip pathology. I referred her to orthopedics for her right hip. I increased her Lyrica 150 TID to Lyrica 300 mg BID. Patient was advised to call the office if intolerant to medication.          The patient returns today with right sided buttocks pain, occasionally radiating into the RLE to the foot in a S1 distribution, denies groin pain. She rates her pain 1-8/10, previously 2-7/10. Her pain is aggravated with standing.  Pt did not follow through with Ortho appointment or EMG study secondary to scheduling conflicts. She was tolerating the Lyrica 300 mg BID but ran out of the 300 mg tablets. Pt states she is taking Lyrica 150 mg QID. Pt admits she preforms her HEP every other day. Pt denies change in bowel or bladder habits.  reviewed. There is no height or weight on file to calculate BMI. PCP: CAROLINE Joe      Past Medical History:   Diagnosis Date    Anxiety     GERD (gastroesophageal reflux disease)     sees a gastroenterologist    Hypertension     Migraine     sees neurologist    Pap smear     sees OB/GYN    Peptic ulcer, unspecified site, unspecified as acute or chronic, without mention of hemorrhage or perforation     S/P endoscopy 2006    peptic ulcer    Sigmoidoscopy exam 2010    internal hemorrhoids        Social History     Socioeconomic History    Marital status: SINGLE     Spouse name: Not on file    Number of children: Not on file    Years of education: Not on file    Highest education level: Not on file   Occupational History    Occupation: dental assistant   Tobacco Use    Smoking status: Never Smoker    Smokeless tobacco: Never Used   Substance and Sexual Activity    Alcohol use: No    Drug use: No    Sexual activity: Yes     Partners: Male   Other Topics Concern    Not on file   Social History Narrative    Not on file     Social Determinants of Health     Financial Resource Strain:     Difficulty of Paying Living Expenses: Not on file   Food Insecurity:     Worried About [address] in the Last Year: Not on file    [address] in the Last Year: Not on file   Transportation Needs:     Lack of Transportation (Medical): Not on file    Lack of Transportation (Non-Medical):  Not on file   Physical Activity:     Days of Exercise per Week: Not on file    Minutes of Exercise per Session: Not on file   Stress:     Feeling of Stress : Not on file   Social Connections:  Frequency of Communication with Friends and Family: Not on file    Frequency of Social Gatherings with Friends and Family: Not on file    Attends Mandaeism Services: Not on file    Active Member of Clubs or Organizations: Not on file    Attends Club or Organization Meetings: Not on file    Marital Status: Not on file   Intimate Partner Violence:     Fear of Current or Ex-Partner: Not on file    Emotionally Abused: Not on file    Physically Abused: Not on file    Sexually Abused: Not on file   Housing Stability:     Unable to Pay for Housing in the Last Year: Not on file    Number of [place] in the Last Year: Not on file    Unstable Housing in the Last Year: Not on file       Current Outpatient Medications   Medication Sig Dispense Refill    ergocalciferol (ERGOCALCIFEROL) 1,250 mcg (50,000 unit) capsule TAKE 1 CAPSULE EVERY WEEK BY ORAL ROUTE.  pregabalin (LYRICA) 300 mg capsule Take 1 Capsule by mouth two (2) times a day. Max Daily Amount: 600 mg. 60 Capsule 1    LORazepam (ATIVAN) 0.5 mg tablet       pregabalin (Lyrica) 150 mg capsule Take 1 Capsule by mouth two (2) times a day. Max Daily Amount: 300 mg. 180 Capsule 0    ALPRAZolam (XANAX) 0.25 mg tablet ONE DAILY AS NEEDED PANIC ATTACK      sertraline (ZOLOFT) 50 mg tablet 100 mg.  pregabalin (Lyrica) 150 mg capsule Take 1 Cap by mouth two (2) times a day. Max Daily Amount: 300 mg. 60 Cap 1    ibuprofen (MOTRIN) 800 mg tablet TAKE 1 TAB BY MOUTH TWO (2) TIMES DAILY AS NEEDED FOR PAIN. 60 Tab 1    lansoprazole (PREVACID) 30 mg capsule TAKE 1 CAPSULE BY MOUTH TWICE A DAY**SPECIFIC NDC PER INSURANCE         Allergies   Allergen Reactions    Latex Rash    Pcn [Penicillins] Hives, Rash and Swelling          PHYSICAL EXAMINATION    There were no vitals taken for this visit.     CONSTITUTIONAL: NAD, A&O x 3  SENSATION: Intact to light touch throughout  RANGE OF MOTION: The patient has full passive range of motion in all four extremities. MOTOR:  Straight Leg Raise: Negative, bilateral     Hip Flex Knee Ext Knee Flex Ankle DF GTE Ankle PF Tone   Right +4/5 +4/5 +4/5 +4/5 +4/5 +4/5 +4/5   Left +4/5 +4/5 +4/5 +4/5 +4/5 +4/5 +4/5       ASSESSMENT   Diagnoses and all orders for this visit:    1. Lumbar neuritis    2. DDD (degenerative disc disease), lumbar    3. HNP (herniated nucleus pulposus), lumbar    4. Sacroiliitis ([address]    5. Severe obesity ([address]    6. Right hip pain      IMPRESSION AND PLAN:  Patient returns to the office today with c/o right sided buttocks pain, occasionally radiating into the RLE to the foot in a S1 distribution, denies groin pain. Multiple treatment options were discussed. Pt wishes to continue with Lyrica 300 mg BID. I provided her refills. I will reorder a RLE EMG with Dr. Amelia Bains. I recommended she increase the frequency of HEP to daily. Patient is neurologically intact. I will see the patient back following EMG or earlier if needed. Written by Pascual Angeles, as dictated by Terrell Aguilar MD  I examined the patient, reviewed and agree with the note.

## 2021-11-19 ENCOUNTER — OFFICE VISIT (OUTPATIENT)
Dept: ORTHOPEDIC SURGERY | Age: 31
End: 2021-11-19
Payer: COMMERCIAL

## 2021-11-19 VITALS
HEART RATE: 94 BPM | SYSTOLIC BLOOD PRESSURE: 128 MMHG | WEIGHT: 212.4 LBS | BODY MASS INDEX: 40.1 KG/M2 | DIASTOLIC BLOOD PRESSURE: 74 MMHG | TEMPERATURE: 97.1 F | RESPIRATION RATE: 16 BRPM | HEIGHT: 61 IN | OXYGEN SATURATION: 98 %

## 2021-11-19 DIAGNOSIS — M51.26 HNP (HERNIATED NUCLEUS PULPOSUS), LUMBAR: ICD-10-CM

## 2021-11-19 DIAGNOSIS — M51.36 DDD (DEGENERATIVE DISC DISEASE), LUMBAR: ICD-10-CM

## 2021-11-19 DIAGNOSIS — M46.1 SACROILIITIS (HCC): Primary | ICD-10-CM

## 2021-11-19 DIAGNOSIS — E66.01 SEVERE OBESITY (HCC): ICD-10-CM

## 2021-11-19 DIAGNOSIS — M54.16 LUMBAR NEURITIS: ICD-10-CM

## 2021-11-19 PROCEDURE — 99213 OFFICE O/P EST LOW 20 MIN: CPT | Performed by: PHYSICAL MEDICINE & REHABILITATION

## 2021-11-19 RX ORDER — PREGABALIN 300 MG/1
300 CAPSULE ORAL 2 TIMES DAILY
Qty: 180 CAPSULE | Refills: 0 | Status: SHIPPED | OUTPATIENT
Start: 2021-11-19 | End: 2021-11-23

## 2021-11-19 RX ORDER — SULFAMETHOXAZOLE AND TRIMETHOPRIM 800; 160 MG/1; MG/1
TABLET ORAL
COMMUNITY
Start: 2021-11-11

## 2021-11-19 NOTE — Clinical Note
11/19/2021    Patient: Efrain Ellis   YOB: 1990   Date of Visit: 11/19/2021     [address] Villalba  [address]     Dear CAROLINE Villalba,      Thank you for referring Ms. Qing Garcia to [address] for evaluation. My notes for this consultation are attached. If you have questions, please do not hesitate to call me. I look forward to following your patient along with you.       Sincerely,    Ramos Helms MD

## 2021-11-23 ENCOUNTER — TELEPHONE (OUTPATIENT)
Dept: ORTHOPEDIC SURGERY | Age: 31
End: 2021-11-23

## 2021-11-23 DIAGNOSIS — M51.36 DDD (DEGENERATIVE DISC DISEASE), LUMBAR: ICD-10-CM

## 2021-11-23 DIAGNOSIS — M54.16 LUMBAR NEURITIS: ICD-10-CM

## 2021-11-23 DIAGNOSIS — M51.26 HNP (HERNIATED NUCLEUS PULPOSUS), LUMBAR: ICD-10-CM

## 2021-11-23 RX ORDER — PREGABALIN 300 MG/1
300 CAPSULE ORAL 2 TIMES DAILY
Qty: 180 CAPSULE | Refills: 0 | Status: SHIPPED | OUTPATIENT
Start: 2021-11-23 | End: 2022-02-09 | Stop reason: SDUPTHER

## 2021-11-23 NOTE — TELEPHONE ENCOUNTER
I spoke to Ms. Claire to schedule a study. She states her pharmacy had to special order the Lyrica and they do not know when it will come in. She is asking if we can send this request to [facility] on [address]. Thank you.

## 2021-11-23 NOTE — TELEPHONE ENCOUNTER
EMG RLE is scheduled with Dr. Devendra Huggins, [address] [place] [address], [address], [phone] on 01/11/22, arrive 10:15AM, test 10:30AM

## 2021-11-26 DIAGNOSIS — M51.26 HNP (HERNIATED NUCLEUS PULPOSUS), LUMBAR: ICD-10-CM

## 2021-11-26 DIAGNOSIS — M51.36 DDD (DEGENERATIVE DISC DISEASE), LUMBAR: ICD-10-CM

## 2021-11-26 DIAGNOSIS — M46.1 SACROILIITIS (HCC): ICD-10-CM

## 2021-11-26 DIAGNOSIS — M54.16 LUMBAR NEURITIS: ICD-10-CM

## 2022-01-13 DIAGNOSIS — M54.16 LUMBAR NEURITIS: ICD-10-CM

## 2022-01-13 DIAGNOSIS — M51.36 DDD (DEGENERATIVE DISC DISEASE), LUMBAR: ICD-10-CM

## 2022-01-13 DIAGNOSIS — M51.26 HNP (HERNIATED NUCLEUS PULPOSUS), LUMBAR: ICD-10-CM

## 2022-01-13 RX ORDER — PREGABALIN 300 MG/1
300 CAPSULE ORAL 2 TIMES DAILY
Qty: 180 CAPSULE | Refills: 0 | OUTPATIENT
Start: 2022-01-13

## 2022-01-13 NOTE — TELEPHONE ENCOUNTER
Patient had to reschedule her EMG with Dr. Ene Martinez due to having covid for 01/24/22 and therefore had to reschedule her follow up appt to review to 02/09/22. She will have run out of the Lyrica by then and is requesting a refill to get her through until being seen. Please advise.      Patient [phone]

## 2022-02-08 NOTE — PROGRESS NOTES
[facility]  [address], [place]   Phone: [phone]  Fax: [phone]        PROGRESS NOTE      HISTORY OF PRESENT ILLNESS:  The patient is a 27 y.o. female and was seen today for follow up of right sided buttocks pain, occasionally radiating into the RLE to the foot in a S1 distribution, denies groin pain. Previously seen for right sided buttocks pain radiating into the RLE into the mid thigh in a L4 distribution. She additionally endorses numbness and tingling of the right foot. Previously seen for low back pain radiating into the RLE in an unclear distribution to the foot involving the digits x 5 years, constant x 12/2019. Her pain is exacerbated by standing and sitting. She has treated with Flexeril and Ibuprofen. Pt reports intolerance to NEURONTIN. Pt previously took Topamax for migraine HA's. She discontinued the medication due to resolution of migraine HA's. She recalls tolerating the medication. Pt completed PT 2-3 years ago. She received chiropractic treatment 2 years ago. She is inconsistent with her HEP. Patient denies previous spinal surgery or injections. Pt denies change in bowel or bladder habits. Pt denies fever, weight loss, or skin changes. She denies possibility of pregnancy or breastfeeding. Patient denies history of glaucoma. She was previously seen by Dr. Monse Avendano. Pt is a nonsmoker. The patient is RHD. PmHx of obesity, peptic ulcer disease, anxiety, migraine HA's. Pt underwent right sided SI joint injection on 6/15/2021 with resolution of her symptoms x 2 weeks. Note from CAROLINE Shelton dated 1/8/2020 indicating patient was seen with c/o right-sided low back pain with right sided sciatica x 6 years following a fall. Treated with Flexeril and Ibuprofen. Pain was 10/10. Note from Jasmin Leal dated 1/8/2020 indicating patient was seen with c/o right-sided low back pain. Treated with Flexeril. Recommended she take Ibuprofen. Referred to Orthopedics. Note from CAROLINE Lombardi dated 2/7/2020 indicating patient's pain was 0/10. Some relief with Flexeril. Noted small no impingement disc protrusion L5-S1. Note from Dr. Mariee dated 2/17/2020 indicating patient was seen with c/o righ-sided buttocks pain. Pain was 5/10. Worse with standing. Taking Neurontin. Pain in roughly L4 or L5 distribution. Indicated he wanted to do a right piriformis injection. Pt reports she did not follow through with injection. Note from J[facility] 8/14/2020 indicating patient was seen with c/o pain on the medial aspect of the right foot. Worse with walking. Improved with rest. Minimal relief with Flexeril and Ibuprofen. Treated with Steroids and recommended a unna boot. Note from Dr. Kirby Course 5/5/2021 indicating patient was seen with c/o right hand paraesthesias in an ulnar nerve distribution. Started on MDP. L spine [facility] 1/8/2020 films not independently reviewed. Per report, no significant abnormality.  RLE EMG dated 12/29/2020 by Dr. Ever Olmstead indicated though her symptoms are fairly typical and likely coming from radiculopathy, there is no electrodiagnostic evidence to suggest radiculopathy on this examination.  L spine MRI dated 8/9/2021 films independently reviewed. Per report,Small, broad-based central disc protrusion at L5-S1, as described above. Otherwise, unremarkable evaluation. At her last clinical appointment, pt wished to continue with Lyrica 300 mg BID. I provided her refills. I reordered a RLE EMG with Dr. Ever Olmstead. I recommended she increase the frequency of HEP to daily.         The patient returns today and reports pain location and distribution remains unchanged. She rates her pain 3-5/10, previously 1-8/10. Pt reports the EMG was delayed due to her getting COVID in early 1/2022. She continues taking Lyrica 300 mg BID with benefit. Pt admits to taking Zoloft secondary to anxiety. Denies taking anticoagulants.  Pt admits she is inconsistent with her HEP. Pt denies change in bowel or bladder habits. A RLE EMG dated 1/24/2022 by Dr. Len Greer was within normal limits.  reviewed. There is no height or weight on file to calculate BMI. PCP: CAROLINE Downing      Past Medical History:   Diagnosis Date    Anxiety     GERD (gastroesophageal reflux disease)     sees a gastroenterologist    Hypertension     Migraine     sees neurologist    Pap smear     sees OB/GYN    Peptic ulcer, unspecified site, unspecified as acute or chronic, without mention of hemorrhage or perforation     S/P endoscopy 2006    peptic ulcer    Sigmoidoscopy exam 2010    internal hemorrhoids        Social History     Socioeconomic History    Marital status:      Spouse name: Not on file    Number of children: Not on file    Years of education: Not on file    Highest education level: Not on file   Occupational History    Occupation: dental assistant   Tobacco Use    Smoking status: Never Smoker    Smokeless tobacco: Never Used   Substance and Sexual Activity    Alcohol use: No    Drug use: No    Sexual activity: Yes     Partners: Male   Other Topics Concern    Not on file   Social History Narrative    Not on file     Social Determinants of Health     Financial Resource Strain:     Difficulty of Paying Living Expenses: Not on file   Food Insecurity:     Worried About [address] in the Last Year: Not on file    [address] in the Last Year: Not on file   Transportation Needs:     Lack of Transportation (Medical): Not on file    Lack of Transportation (Non-Medical):  Not on file   Physical Activity:     Days of Exercise per Week: Not on file    Minutes of Exercise per Session: Not on file   Stress:     Feeling of Stress : Not on file   Social Connections:     Frequency of Communication with Friends and Family: Not on file    Frequency of Social Gatherings with Friends and Family: Not on file    Attends [employer] Services: Not on file    Active Member of Clubs or Organizations: Not on file    Attends Club or Organization Meetings: Not on file    Marital Status: Not on file   Intimate Partner Violence:     Fear of Current or Ex-Partner: Not on file    Emotionally Abused: Not on file    Physically Abused: Not on file    Sexually Abused: Not on file   Housing Stability:     Unable to Pay for Housing in the Last Year: Not on file    Number of [place] in the Last Year: Not on file    Unstable Housing in the Last Year: Not on file       Current Outpatient Medications   Medication Sig Dispense Refill    ondansetron hcl (Zofran) 4 mg tablet Take 1 Tablet by mouth every eight (8) hours as needed for Nausea or Vomiting. 20 Tablet 0    pregabalin (LYRICA) 300 mg capsule Take 1 Capsule by mouth two (2) times a day. Max Daily Amount: 600 mg. 180 Capsule 0    trimethoprim-sulfamethoxazole (BACTRIM DS, SEPTRA DS) 160-800 mg per tablet       ergocalciferol (ERGOCALCIFEROL) 1,250 mcg (50,000 unit) capsule TAKE 1 CAPSULE EVERY WEEK BY ORAL ROUTE. (Patient not taking: Reported on 11/19/2021)      LORazepam (ATIVAN) 0.5 mg tablet       ALPRAZolam (XANAX) 0.25 mg tablet ONE DAILY AS NEEDED PANIC ATTACK      sertraline (ZOLOFT) 50 mg tablet 100 mg.  ibuprofen (MOTRIN) 800 mg tablet TAKE 1 TAB BY MOUTH TWO (2) TIMES DAILY AS NEEDED FOR PAIN. 60 Tab 1    lansoprazole (PREVACID) 30 mg capsule TAKE 1 CAPSULE BY MOUTH TWICE A DAY**SPECIFIC NDC PER INSURANCE         Allergies   Allergen Reactions    Latex Rash    Pcn [Penicillins] Hives, Rash and Swelling          PHYSICAL EXAMINATION    There were no vitals taken for this visit. CONSTITUTIONAL: NAD, A&O x 3  SENSATION: Intact to light touch throughout  RANGE OF MOTION: The patient has full passive range of motion in all four extremities.   MOTOR:  Straight Leg Raise: Negative, bilateral    BECKY test: positive, right   Increase in tenderness w/direct palpation of the right SI. Hip Flex Knee Ext Knee Flex Ankle DF GTE Ankle PF Tone   Right +4/5 +4/5 +4/5 +4/5 +4/5 +4/5 +4/5   Left +4/5 +4/5 +4/5 +4/5 +4/5 +4/5 +4/5       ASSESSMENT   Diagnoses and all orders for this visit:    1. Lumbar neuritis    2. DDD (degenerative disc disease), lumbar    3. HNP (herniated nucleus pulposus), lumbar    4. Sacroiliitis ([facility] [address]      IMPRESSION AND PLAN:  Patient returns to the office today with c/o right sided buttocks pain, occasionally radiating into the RLE to the foot in a S1 distribution. Multiple treatment options were discussed. I offered switching her from her Zoloft to Cymbalta, pt declined. Patient wished to continue her current treatment. I provided her refills of Lyrica 300 mg BID. I recommended she increase the frequency of HEP to daily. Patient is neurologically intact. I will see the patient back in 3 month's time or earlier if needed. Written by Flory Olson, as dictated by Marisel Nicholson MD  I examined the patient, reviewed and agree with the note.

## 2022-02-09 ENCOUNTER — OFFICE VISIT (OUTPATIENT)
Dept: ORTHOPEDIC SURGERY | Age: 32
End: 2022-02-09
Payer: COMMERCIAL

## 2022-02-09 VITALS
BODY MASS INDEX: 40.6 KG/M2 | OXYGEN SATURATION: 99 % | TEMPERATURE: 97 F | WEIGHT: 206.8 LBS | HEIGHT: 60 IN | HEART RATE: 84 BPM

## 2022-02-09 DIAGNOSIS — M54.16 LUMBAR NEURITIS: Primary | ICD-10-CM

## 2022-02-09 DIAGNOSIS — M46.1 SACROILIITIS (HCC): ICD-10-CM

## 2022-02-09 DIAGNOSIS — M51.26 HNP (HERNIATED NUCLEUS PULPOSUS), LUMBAR: ICD-10-CM

## 2022-02-09 DIAGNOSIS — M51.36 DDD (DEGENERATIVE DISC DISEASE), LUMBAR: ICD-10-CM

## 2022-02-09 PROCEDURE — 99213 OFFICE O/P EST LOW 20 MIN: CPT | Performed by: PHYSICAL MEDICINE & REHABILITATION

## 2022-02-09 RX ORDER — PREGABALIN 300 MG/1
300 CAPSULE ORAL 2 TIMES DAILY
Qty: 180 CAPSULE | Refills: 0 | Status: SHIPPED | OUTPATIENT
Start: 2022-02-09 | End: 2022-05-06 | Stop reason: SDUPTHER

## 2022-02-09 NOTE — Clinical Note
2/9/2022    Patient: Carlton Santana   YOB: 1990   Date of Visit: 2/9/2022     Berenice Madsen  Via     Dear CAROLINE Madsen,      Thank you for referring Ms. Dar Coleman to [address] for evaluation. My notes for this consultation are attached. If you have questions, please do not hesitate to call me. I look forward to following your patient along with you.       Sincerely,    Jesica Seymour MD

## 2022-03-18 PROBLEM — E66.01 SEVERE OBESITY (HCC): Status: ACTIVE | Noted: 2020-12-07

## 2022-04-02 ASSESSMENT — VISUAL ACUITY
OD_CC: J1
OS_SC: 20/30
OD_SC: 20/25
OS_SC: 20/20
OD_SC: 20/20
OS_SC: 20/25
OS_CC: J1
OS_SC: 20/20-2
OD_SC: 20/20
OD_SC: 20/20

## 2022-04-02 ASSESSMENT — TONOMETRY
OS_IOP_MMHG: 17
OS_IOP_MMHG: 17
OD_IOP_MMHG: 17
OD_IOP_MMHG: 17

## 2022-05-05 NOTE — PROGRESS NOTES
[facility]  [address], [place]   Phone: [phone]  Fax: [phone]        PROGRESS NOTE      HISTORY OF PRESENT ILLNESS:  The patient is a 27 y.o. female and was seen today for follow up of right sided buttocks pain, occasionally radiating into the RLE to the foot in a S1 distribution, denies groin pain. Previously seen for right sided buttocks pain radiating into the RLE into the mid thigh in a L4 distribution. She additionally endorses numbness and tingling of the right foot. Previously seen for low back pain radiating into the RLE in an unclear distribution to the foot involving the digits x 5 years, constant x 12/2019. Her pain is exacerbated by standing and sitting. She has treated with Flexeril and Ibuprofen. Pt reports intolerance to NEURONTIN. Pt previously took Topamax for migraine HA's. She discontinued the medication due to resolution of migraine HA's. She recalls tolerating the medication. Pt completed PT 2-3 years ago. She received chiropractic treatment 2 years ago. She is inconsistent with her HEP. Patient denies previous spinal surgery or injections. Pt denies change in bowel or bladder habits. Pt denies fever, weight loss, or skin changes. She denies possibility of pregnancy or breastfeeding. Patient denies history of glaucoma. She was previously seen by Dr. Agnieszka Vargas. Pt is a nonsmoker. The patient is RHD. PmHx of obesity, peptic ulcer disease, anxiety, migraine HA's. Pt underwent right sided SI joint injection on 6/15/2021 with resolution of her symptoms x 2 weeks. Note from CAROLINE Shelton dated 1/8/2020 indicating patient was seen with c/o right-sided low back pain with right sided sciatica x 6 years following a fall. Treated with Flexeril and Ibuprofen. Pain was 10/10. Note from Jasmin Leal dated 1/8/2020 indicating patient was seen with c/o right-sided low back pain. Treated with Flexeril. Recommended she take Ibuprofen. Referred to Orthopedics. Note from CAROLINE Lombardi dated 2/7/2020 indicating patient's pain was 0/10. Some relief with Flexeril. Noted small no impingement disc protrusion L5-S1. Note from Dr. Mariee dated 2/17/2020 indicating patient was seen with c/o righ-sided buttocks pain. Pain was 5/10. Worse with standing. Taking Neurontin. Pain in roughly L4 or L5 distribution. Indicated he wanted to do a right piriformis injection. Pt reports she did not follow through with injection. Note from Jabari [facility] 8/14/2020 indicating patient was seen with c/o pain on the medial aspect of the right foot. Worse with walking. Improved with rest. Minimal relief with Flexeril and Ibuprofen. Treated with Steroids and recommended a unna boot. Note from Dr. Francy Deleon 5/5/2021 indicating patient was seen with c/o right hand paraesthesias in an ulnar nerve distribution. Started on MDP. L spine [facility] 1/8/2020 films not independently reviewed. Per report, no significant abnormality.  RLE EMG dated 12/29/2020 by Dr. Bryan Hendrix indicated though her symptoms are fairly typical and likely coming from radiculopathy, there is no electrodiagnostic evidence to suggest radiculopathy on this examination. L spine MRI dated 8/9/2021 films independently reviewed. Per report, small, broad-based central disc protrusion at L5-S1, as described above. Otherwise, unremarkable evaluation. At her last clinical appointment, I offered switching her from her Zoloft to Cymbalta, pt declined. Patient wished to continue her current treatment. I provided her refills of Lyrica 300 mg BID. I recommended she increase the frequency of HEP to daily.        The patient returns today and reports pain location and distribution remains unchanged. She rates her pain 3/10, previously 3-5/10. Overall, the pt reports her pain has been better but the last two weeks has been aggravated. Denies any changes in activities. She is complaint with her Lyrica 300 mg BID. She is compliant with her HEP. Pt denies change in bowel or bladder habits.  reviewed. Body mass index is 41.79 kg/m². PCP: CAROLINE Otero      Past Medical History:   Diagnosis Date    Anxiety     GERD (gastroesophageal reflux disease)     sees a gastroenterologist    Hypertension     Migraine     sees neurologist    Pap smear     sees OB/GYN    Peptic ulcer, unspecified site, unspecified as acute or chronic, without mention of hemorrhage or perforation     S/P endoscopy 2006    peptic ulcer    Sigmoidoscopy exam 2010    internal hemorrhoids        Social History     Socioeconomic History    Marital status:      Spouse name: Not on file    Number of children: Not on file    Years of education: Not on file    Highest education level: Not on file   Occupational History    Occupation: dental assistant   Tobacco Use    Smoking status: Never Smoker    Smokeless tobacco: Never Used   Substance and Sexual Activity    Alcohol use: No    Drug use: No    Sexual activity: Yes     Partners: Male   Other Topics Concern    Not on file   Social History Narrative    Not on file     Social Determinants of Health     Financial Resource Strain:     Difficulty of Paying Living Expenses: Not on file   Food Insecurity:     Worried About [address] in the Last Year: Not on file    [address] in the Last Year: Not on file   Transportation Needs:     Lack of Transportation (Medical): Not on file    Lack of Transportation (Non-Medical):  Not on file   Physical Activity:     Days of Exercise per Week: Not on file    Minutes of Exercise per Session: Not on file   Stress:     Feeling of Stress : Not on file   Social Connections:     Frequency of Communication with Friends and Family: Not on file    Frequency of Social Gatherings with Friends and Family: Not on file    Attends Ep[employer] Services: Not on file    Active Member of Clubs or Organizations: Not on file   Lily Attends Club or Organization Meetings: Not on file    Marital Status: Not on file   Intimate Partner Violence:     Fear of Current or Ex-Partner: Not on file    Emotionally Abused: Not on file    Physically Abused: Not on file    Sexually Abused: Not on file   Housing Stability:     Unable to Pay for Housing in the Last Year: Not on file    Number of [place] in the Last Year: Not on file    Unstable Housing in the Last Year: Not on file       Current Outpatient Medications   Medication Sig Dispense Refill    pantoprazole (PROTONIX) 40 mg tablet Take 40 mg by mouth two (2) times a day.  pregabalin (LYRICA) 300 mg capsule Take 1 Capsule by mouth two (2) times a day. Max Daily Amount: 600 mg. 180 Capsule 0    LORazepam (ATIVAN) 0.5 mg tablet       ALPRAZolam (XANAX) 0.25 mg tablet ONE DAILY AS NEEDED PANIC ATTACK      sertraline (ZOLOFT) 50 mg tablet 100 mg.  ibuprofen (MOTRIN) 800 mg tablet TAKE 1 TAB BY MOUTH TWO (2) TIMES DAILY AS NEEDED FOR PAIN. 60 Tab 1    azithromycin (ZITHROMAX) 250 mg tablet  (Patient not taking: Reported on 5/6/2022)      methylPREDNISolone (MEDROL DOSEPACK) 4 mg tablet  (Patient not taking: Reported on 5/6/2022)      ondansetron hcl (Zofran) 4 mg tablet Take 1 Tablet by mouth every eight (8) hours as needed for Nausea or Vomiting.  (Patient not taking: Reported on 5/6/2022) 20 Tablet 0    trimethoprim-sulfamethoxazole (BACTRIM DS, SEPTRA DS) 160-800 mg per tablet  (Patient not taking: Reported on 5/6/2022)      ergocalciferol (ERGOCALCIFEROL) 1,250 mcg (50,000 unit) capsule TAKE 1 CAPSULE EVERY WEEK BY ORAL ROUTE. (Patient not taking: Reported on 11/19/2021)      lansoprazole (PREVACID) 30 mg capsule TAKE 1 CAPSULE BY MOUTH TWICE A DAY**SPECIFIC NDC PER INSURANCE (Patient not taking: Reported on 5/6/2022)         Allergies   Allergen Reactions    Latex Rash    Pcn [Penicillins] Hives, Rash and Swelling          PHYSICAL EXAMINATION    Visit Vitals  Pulse 86 Temp 97.8 °F (36.6 °C) (Temporal)   Ht 5' (1.524 m)   Wt 214 lb (97.1 kg)   SpO2 97%   BMI 41.79 kg/m²       CONSTITUTIONAL: NAD, A&O x 3  SENSATION: Intact to light touch throughout  RANGE OF MOTION: The patient has full passive range of motion in all four extremities. MOTOR:  Straight Leg Raise: Negative, bilateral               Hip Flex Knee Ext Knee Flex Ankle DF GTE Ankle PF Tone   Right +4/5 +4/5 +4/5 +4/5 +4/5 +4/5 +4/5   Left +4/5 +4/5 +4/5 +4/5 +4/5 +4/5 +4/5       ASSESSMENT   Diagnoses and all orders for this visit:    1. Lumbar neuritis    2. DDD (degenerative disc disease), lumbar    3. HNP (herniated nucleus pulposus), lumbar    4. Sacroiliitis ([address]    5. Severe obesity ([address]      IMPRESSION AND PLAN:  Patient returns to the office today with c/o right sided buttocks pain, occasionally radiating into the RLE to the foot in a S1 distribution. Multiple treatment options were discussed. Patient wished to continue her current treatment. I provided her refills of Lyrica 300 mg BID. I encouraged her to continue to perform her daily HEP. Patient is neurologically intact. I will see the patient back in 3 month's time or earlier if needed. Written by Bill Bush, as dictated by Moises Mallory MD  I examined the patient, reviewed and agree with the note.

## 2022-05-06 ENCOUNTER — OFFICE VISIT (OUTPATIENT)
Dept: ORTHOPEDIC SURGERY | Age: 32
End: 2022-05-06
Payer: COMMERCIAL

## 2022-05-06 VITALS
WEIGHT: 214 LBS | BODY MASS INDEX: 42.01 KG/M2 | TEMPERATURE: 97.8 F | HEIGHT: 60 IN | HEART RATE: 86 BPM | OXYGEN SATURATION: 97 %

## 2022-05-06 DIAGNOSIS — M51.26 HNP (HERNIATED NUCLEUS PULPOSUS), LUMBAR: ICD-10-CM

## 2022-05-06 DIAGNOSIS — M46.1 SACROILIITIS (HCC): ICD-10-CM

## 2022-05-06 DIAGNOSIS — E66.01 SEVERE OBESITY (HCC): ICD-10-CM

## 2022-05-06 DIAGNOSIS — M51.36 DDD (DEGENERATIVE DISC DISEASE), LUMBAR: ICD-10-CM

## 2022-05-06 DIAGNOSIS — M54.16 LUMBAR NEURITIS: Primary | ICD-10-CM

## 2022-05-06 PROCEDURE — 99213 OFFICE O/P EST LOW 20 MIN: CPT | Performed by: PHYSICAL MEDICINE & REHABILITATION

## 2022-05-06 RX ORDER — PANTOPRAZOLE SODIUM 40 MG/1
40 TABLET, DELAYED RELEASE ORAL 2 TIMES DAILY
COMMUNITY
Start: 2022-04-21

## 2022-05-06 RX ORDER — METHYLPREDNISOLONE 4 MG/1
TABLET ORAL
COMMUNITY
Start: 2022-04-25

## 2022-05-06 RX ORDER — AZITHROMYCIN 250 MG/1
TABLET, FILM COATED ORAL
COMMUNITY
Start: 2022-03-29

## 2022-05-06 RX ORDER — PREGABALIN 300 MG/1
300 CAPSULE ORAL 2 TIMES DAILY
Qty: 180 CAPSULE | Refills: 0 | Status: SHIPPED | OUTPATIENT
Start: 2022-05-06 | End: 2022-09-06 | Stop reason: SDUPTHER

## 2022-05-06 NOTE — Clinical Note
5/6/2022    Patient: Juany Causey   YOB: 1990   Date of Visit: 5/6/2022     Berenice Verde  Via     Dear CAROLINE Verde,      Thank you for referring Ms. Melody Roy to [address] for evaluation. My notes for this consultation are attached. If you have questions, please do not hesitate to call me. I look forward to following your patient along with you.       Sincerely,    Shanda Mcnulty MD

## 2022-08-11 ENCOUNTER — TELEPHONE (OUTPATIENT)
Dept: ORTHOPEDIC SURGERY | Age: 32
End: 2022-08-11

## 2022-08-11 NOTE — TELEPHONE ENCOUNTER
Patient is asking if it is anyway she can be seen every 6 months instead of every 3 months. Please advise. Patient can be reached at [phone].

## 2022-08-12 ENCOUNTER — PATIENT MESSAGE (OUTPATIENT)
Dept: ORTHOPEDIC SURGERY | Age: 32
End: 2022-08-12

## 2022-08-12 DIAGNOSIS — M51.26 HNP (HERNIATED NUCLEUS PULPOSUS), LUMBAR: ICD-10-CM

## 2022-08-12 DIAGNOSIS — M54.16 LUMBAR NEURITIS: ICD-10-CM

## 2022-08-12 DIAGNOSIS — M51.36 DDD (DEGENERATIVE DISC DISEASE), LUMBAR: ICD-10-CM

## 2022-08-12 NOTE — TELEPHONE ENCOUNTER
MD [place], RT  Caller: Unspecified (Yesterday,  4:32 PM)  If things are stable, I'd be agreeable to that

## 2022-09-06 RX ORDER — PREGABALIN 300 MG/1
300 CAPSULE ORAL 2 TIMES DAILY
Qty: 180 CAPSULE | Refills: 0 | Status: SHIPPED | OUTPATIENT
Start: 2022-09-06

## 2022-09-06 NOTE — TELEPHONE ENCOUNTER
From: Anayeli Jeronimo  To: Major Lowe MD  Sent: 8/12/2022 9:58 AM EDT  Subject: Appointments    Hello,  Im usually seen every 3 months and at each appointment everything is usually the same and we just continue on my medication. Is there a way to push them out to every 6 months? ?    Thank you

## 2022-12-09 ENCOUNTER — OFFICE VISIT (OUTPATIENT)
Dept: ORTHOPEDIC SURGERY | Age: 32
End: 2022-12-09
Payer: COMMERCIAL

## 2022-12-09 VITALS
WEIGHT: 222 LBS | SYSTOLIC BLOOD PRESSURE: 117 MMHG | HEIGHT: 60 IN | BODY MASS INDEX: 43.59 KG/M2 | TEMPERATURE: 97.2 F | HEART RATE: 76 BPM | DIASTOLIC BLOOD PRESSURE: 81 MMHG

## 2022-12-09 DIAGNOSIS — M54.16 LUMBAR NEURITIS: Primary | ICD-10-CM

## 2022-12-09 DIAGNOSIS — M51.26 HNP (HERNIATED NUCLEUS PULPOSUS), LUMBAR: ICD-10-CM

## 2022-12-09 DIAGNOSIS — M51.36 DDD (DEGENERATIVE DISC DISEASE), LUMBAR: ICD-10-CM

## 2022-12-09 PROCEDURE — 99213 OFFICE O/P EST LOW 20 MIN: CPT | Performed by: PHYSICAL MEDICINE & REHABILITATION

## 2022-12-09 RX ORDER — PREGABALIN 300 MG/1
300 CAPSULE ORAL 2 TIMES DAILY
Qty: 180 CAPSULE | Refills: 1 | Status: SHIPPED | OUTPATIENT
Start: 2022-12-09

## 2022-12-09 NOTE — Clinical Note
12/9/2022    Patient: Jorge Barnes   YOB: 1990   Date of Visit: 12/9/2022     Henrik Taylor, [address]  V[address]     Dear CAROLINE Estrella,      Thank you for referring Ms. Kory Gooden to [address] for evaluation. My notes for this consultation are attached. If you have questions, please do not hesitate to call me. I look forward to following your patient along with you.       Sincerely,    Jonathan Alfred MD

## 2022-12-09 NOTE — PROGRESS NOTES
[facility]  [address]  [facility], [place]   Phone: [phone]  Fax: [phone]        PROGRESS NOTE      HISTORY OF PRESENT ILLNESS:  The patient is a 34 y.o. female and was seen today for follow up of right sided buttocks pain, occasionally radiating into the RLE to the foot in a S1 distribution, denies groin pain. Previously seen for right sided buttocks pain radiating into the RLE into the mid thigh in a L4 distribution. She additionally endorses numbness and tingling of the right foot. Previously seen for low back pain radiating into the RLE in an unclear distribution to the foot involving the digits x 5 years, constant x 12/2019. Her pain is exacerbated by standing and sitting. She has treated with Flexeril and Ibuprofen. Pt reports intolerance to NEURONTIN. Pt previously took Topamax for migraine HA's. She discontinued the medication due to resolution of migraine HA's. She recalls tolerating the medication. Pt completed PT 2-3 years ago. She received chiropractic treatment 2 years ago. She is inconsistent with her HEP. Patient denies previous spinal surgery or injections. Pt denies change in bowel or bladder habits. Pt denies fever, weight loss, or skin changes. She denies possibility of pregnancy or breastfeeding. Patient denies history of glaucoma. She was previously seen by Dr. Luciano Kasper. Pt is a nonsmoker. The patient is RHD. PmHx of obesity, peptic ulcer disease, anxiety, migraine HA's. Pt underwent right sided SI joint injection on 6/15/2021 with resolution of her symptoms x 2 weeks. Note from Katharina Habermann, [id number] dated 1/8/2020 indicating patient was seen with c/o right-sided low back pain with right sided sciatica x 6 years following a fall. Treated with Flexeril and Ibuprofen. Pain was 10/10. Note from Katharina Habermann dated 1/8/2020 indicating patient was seen with c/o right-sided low back pain. Treated with Flexeril.  Recommended she take Ibuprofen. Referred to Orthopedics. Note from Davin [place] dated 2/7/2020 indicating patient's pain was 0/10. Some relief with Flexeril. Noted small no impingement disc protrusion L5-S1. Note from Dr. Luc Wu dated 2/17/2020 indicating patient was seen with c/o righ-sided buttocks pain. Pain was 5/10. Worse with standing. Taking Neurontin. Pain in roughly L4 or L5 distribution. Indicated he wanted to do a right piriformis injection. Pt reports she did not follow through with injection. Note from Marge Palafox DPM dated 8/14/2020 indicating patient was seen with c/o pain on the medial aspect of the right foot. Worse with walking. Improved with rest. Minimal relief with Flexeril and Ibuprofen. Treated with Steroids and recommended a unna boot. Note from Dr. Daren Jones dated 5/5/2021 indicating patient was seen with c/o right hand paraesthesias in an ulnar nerve distribution. Started on MDP. L spine XR dated 1/8/2020 films not independently reviewed. Per report, no significant abnormality. RLE EMG dated 12/29/2020 by Dr. Wang Lab indicated though her symptoms are fairly typical and likely coming from radiculopathy, there is no electrodiagnostic evidence to suggest radiculopathy on this examination. L spine MRI dated 8/9/2021 films independently reviewed. Per report, small, broad-based central disc protrusion at L5-S1, as described above. Otherwise, unremarkable evaluation. At her last clinical appointment, Patient wished to continue her current treatment plan. I provided her refills of Lyrica 300 mg BID. I encouraged her to continue to perform her daily HEP. The patient returns today with pain location and distribution remains unchanged. She rates her pain 2-5/10, previously 3/10. Patient is still taking the Lyrica 300 mg BID. Pt denies change in bowel or bladder habits. Patient went to the chiropractor since last office visit and believes it helps her symptoms.  Patient says that overall her pain is the same when compared to last office visit. Patient also had right foot surgery on 10/21/2022. Patient has not been doing her daily HEP.  reviewed. Oxycodone from Dr. Pia Og and martina canela  Body mass index is 43.36 kg/m². PCP: CAROLINE Downing      Past Medical History:   Diagnosis Date    Anxiety     GERD (gastroesophageal reflux disease)     sees a gastroenterologist    Hypertension     Migraine     sees neurologist    Pap smear     sees OB/GYN    Peptic ulcer, unspecified site, unspecified as acute or chronic, without mention of hemorrhage or perforation     S/P endoscopy 2006    peptic ulcer    Sigmoidoscopy exam 2010    internal hemorrhoids        Social History     Socioeconomic History    Marital status:      Spouse name: Not on file    Number of children: Not on file    Years of education: Not on file    Highest education level: Not on file   Occupational History    Occupation: dental assistant   Tobacco Use    Smoking status: Never    Smokeless tobacco: Never   Substance and Sexual Activity    Alcohol use: No    Drug use: No    Sexual activity: Yes     Partners: Male   Other Topics Concern    Not on file   Social History Narrative    Not on file     Social Determinants of Health     Financial Resource Strain: Not on file   Food Insecurity: Not on file   Transportation Needs: Not on file   Physical Activity: Not on file   Stress: Not on file   Social Connections: Not on file   Intimate Partner Violence: Not on file   Housing Stability: Not on file       Current Outpatient Medications   Medication Sig Dispense Refill    pregabalin (LYRICA) 300 mg capsule Take 1 Capsule by mouth two (2) times a day. Max Daily Amount: 600 mg. 180 Capsule 0    pantoprazole (PROTONIX) 40 mg tablet Take 40 mg by mouth two (2) times a day.       LORazepam (ATIVAN) 0.5 mg tablet       sertraline (ZOLOFT) 50 mg tablet 100 mg.      ibuprofen (MOTRIN) 800 mg tablet TAKE 1 TAB BY MOUTH TWO (2) TIMES DAILY AS NEEDED FOR PAIN. 60 Tab 1    azithromycin (ZITHROMAX) 250 mg tablet  (Patient not taking: Reported on 5/6/2022)      methylPREDNISolone (MEDROL DOSEPACK) 4 mg tablet  (Patient not taking: Reported on 5/6/2022)      ondansetron hcl (Zofran) 4 mg tablet Take 1 Tablet by mouth every eight (8) hours as needed for Nausea or Vomiting. (Patient not taking: Reported on 5/6/2022) 20 Tablet 0    trimethoprim-sulfamethoxazole (BACTRIM DS, SEPTRA DS) 160-800 mg per tablet  (Patient not taking: Reported on 5/6/2022)      ergocalciferol (ERGOCALCIFEROL) 1,250 mcg (50,000 unit) capsule TAKE 1 CAPSULE EVERY WEEK BY ORAL ROUTE. (Patient not taking: Reported on 11/19/2021)      ALPRAZolam (XANAX) 0.25 mg tablet ONE DAILY AS NEEDED PANIC ATTACK      lansoprazole (PREVACID) 30 mg capsule TAKE 1 CAPSULE BY MOUTH TWICE A DAY**SPECIFIC NDC PER INSURANCE (Patient not taking: Reported on 5/6/2022)         Allergies   Allergen Reactions    Latex Rash    Pcn [Penicillins] Hives, Rash and Swelling          PHYSICAL EXAMINATION    Visit Vitals  /81   Pulse 76   Temp 97.2 °F (36.2 °C)   Ht 5' (1.524 m)   Wt 222 lb (100.7 kg)   BMI 43.36 kg/m²       CONSTITUTIONAL: NAD, A&O x 3  SENSATION: Intact to light touch throughout  RANGE OF MOTION: The patient has full passive range of motion in all four extremities. MOTOR:  Straight Leg Raise: Negative, bilateral    Ambulates without assistive device. Hip Flex Knee Ext Knee Flex Ankle DF GTE Ankle PF Tone   Right +4/5 +4/5 +4/5 +4/5 +4/5 +4/5 +4/5   Left +4/5 +4/5 +4/5 +4/5 +4/5 +4/5 +4/5       ASSESSMENT   Diagnoses and all orders for this visit:    1. Lumbar neuritis    2. DDD (degenerative disc disease), lumbar    3. HNP (herniated nucleus pulposus), lumbar        IMPRESSION AND PLAN:  Patient returns to the office today with c/o  right sided buttocks pain, occasionally radiating into the RLE to the foot in a S1 distribution, denies groin pain.  Multiple treatment options were discussed. Patient is not interested in surgery or injection at this time. I offered changing her neuropathic medication or keeping her on the  Lyrica 300 mg BID. I recommended she increase the frequency of HEP to daily. Patient wished to continue her current treatment. I refilled her Lyrica 300 mg BID. Patient is neurologically intact. I will see the patient back in 6 month's time or earlier if needed. Written by Mare Galeana, as dictated by Maureen Ledesma MD  I examined the patient, reviewed and agree with the note.

## 2023-06-05 DIAGNOSIS — M54.16 RADICULOPATHY, LUMBAR REGION: Primary | ICD-10-CM

## 2023-06-05 DIAGNOSIS — M46.1 SACROILIITIS, NOT ELSEWHERE CLASSIFIED (HCC): ICD-10-CM

## 2023-06-05 DIAGNOSIS — M51.26 OTHER INTERVERTEBRAL DISC DISPLACEMENT, LUMBAR REGION: ICD-10-CM

## 2023-06-05 DIAGNOSIS — M51.36 OTHER INTERVERTEBRAL DISC DEGENERATION, LUMBAR REGION: ICD-10-CM

## 2023-06-06 RX ORDER — PREGABALIN 300 MG/1
CAPSULE ORAL
Qty: 180 CAPSULE | Refills: 0 | Status: SHIPPED | OUTPATIENT
Start: 2023-06-06 | End: 2023-09-04

## 2023-06-26 ENCOUNTER — OFFICE VISIT (OUTPATIENT)
Age: 33
End: 2023-06-26
Payer: COMMERCIAL

## 2023-06-26 VITALS
WEIGHT: 220 LBS | HEART RATE: 88 BPM | OXYGEN SATURATION: 99 % | SYSTOLIC BLOOD PRESSURE: 123 MMHG | BODY MASS INDEX: 43.19 KG/M2 | TEMPERATURE: 98.2 F | HEIGHT: 60 IN | DIASTOLIC BLOOD PRESSURE: 85 MMHG

## 2023-06-26 DIAGNOSIS — M54.16 LUMBAR NEURITIS: Primary | ICD-10-CM

## 2023-06-26 DIAGNOSIS — M51.26 HNP (HERNIATED NUCLEUS PULPOSUS), LUMBAR: ICD-10-CM

## 2023-06-26 DIAGNOSIS — M53.3 SI (SACROILIAC) JOINT DYSFUNCTION: ICD-10-CM

## 2023-06-26 DIAGNOSIS — M51.36 DDD (DEGENERATIVE DISC DISEASE), LUMBAR: ICD-10-CM

## 2023-06-26 PROCEDURE — 99214 OFFICE O/P EST MOD 30 MIN: CPT | Performed by: PHYSICAL MEDICINE & REHABILITATION

## 2023-06-26 RX ORDER — PREGABALIN 75 MG/1
75 CAPSULE ORAL 2 TIMES DAILY
Qty: 90 CAPSULE | Refills: 0 | Status: SHIPPED | OUTPATIENT
Start: 2023-06-26 | End: 2023-08-10
